# Patient Record
Sex: MALE | Race: WHITE | NOT HISPANIC OR LATINO | Employment: FULL TIME | URBAN - METROPOLITAN AREA
[De-identification: names, ages, dates, MRNs, and addresses within clinical notes are randomized per-mention and may not be internally consistent; named-entity substitution may affect disease eponyms.]

---

## 2020-06-09 ENCOUNTER — OFFICE VISIT (OUTPATIENT)
Dept: FAMILY MEDICINE CLINIC | Facility: CLINIC | Age: 40
End: 2020-06-09
Payer: COMMERCIAL

## 2020-06-09 ENCOUNTER — TELEPHONE (OUTPATIENT)
Dept: FAMILY MEDICINE CLINIC | Facility: CLINIC | Age: 40
End: 2020-06-09

## 2020-06-09 VITALS
SYSTOLIC BLOOD PRESSURE: 130 MMHG | BODY MASS INDEX: 35.56 KG/M2 | DIASTOLIC BLOOD PRESSURE: 80 MMHG | TEMPERATURE: 98.2 F | HEIGHT: 71 IN | RESPIRATION RATE: 18 BRPM | HEART RATE: 69 BPM | WEIGHT: 254 LBS | OXYGEN SATURATION: 98 %

## 2020-06-09 DIAGNOSIS — Z11.4 SCREENING FOR HIV (HUMAN IMMUNODEFICIENCY VIRUS): ICD-10-CM
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
## 2020-06-09 DIAGNOSIS — Z00.00 WELL ADULT EXAM: Primary | ICD-10-CM

## 2020-06-09 DIAGNOSIS — E66.01 SEVERE OBESITY (BMI 35.0-39.9) WITH COMORBIDITY (HCC): ICD-10-CM

## 2020-06-09 DIAGNOSIS — Z23 NEED FOR VACCINATION: ICD-10-CM

## 2020-06-09 DIAGNOSIS — Z13.6 SCREENING FOR CARDIOVASCULAR CONDITION: ICD-10-CM

## 2020-06-09 PROCEDURE — 90471 IMMUNIZATION ADMIN: CPT

## 2020-06-09 PROCEDURE — 3008F BODY MASS INDEX DOCD: CPT | Performed by: FAMILY MEDICINE

## 2020-06-09 PROCEDURE — 99385 PREV VISIT NEW AGE 18-39: CPT | Performed by: FAMILY MEDICINE

## 2020-06-09 PROCEDURE — 90715 TDAP VACCINE 7 YRS/> IM: CPT

## 2020-06-12 DIAGNOSIS — E78.2 MIXED HYPERLIPIDEMIA: Primary | ICD-10-CM

## 2020-06-12 LAB
ALBUMIN SERPL-MCNC: 4.5 G/DL (ref 3.6–5.1)
ALBUMIN/GLOB SERPL: 1.6 (CALC) (ref 1–2.5)
ALP SERPL-CCNC: 71 U/L (ref 36–130)
ALT SERPL-CCNC: 31 U/L (ref 9–46)
AST SERPL-CCNC: 18 U/L (ref 10–40)
B BURGDOR AB SER IA-ACNC: <0.9 INDEX
BILIRUB SERPL-MCNC: 0.4 MG/DL (ref 0.2–1.2)
BUN SERPL-MCNC: 17 MG/DL (ref 7–25)
BUN/CREAT SERPL: NORMAL (CALC) (ref 6–22)
CALCIUM SERPL-MCNC: 9.6 MG/DL (ref 8.6–10.3)
CHLORIDE SERPL-SCNC: 103 MMOL/L (ref 98–110)
CHOLEST SERPL-MCNC: 223 MG/DL
CHOLEST/HDLC SERPL: 3.7 (CALC)
CO2 SERPL-SCNC: 27 MMOL/L (ref 20–32)
CREAT SERPL-MCNC: 0.91 MG/DL (ref 0.6–1.35)
GLOBULIN SER CALC-MCNC: 2.9 G/DL (CALC) (ref 1.9–3.7)
GLUCOSE SERPL-MCNC: 83 MG/DL (ref 65–99)
HDLC SERPL-MCNC: 61 MG/DL
HIV 1+2 AB+HIV1 P24 AG SERPL QL IA: NORMAL
LDLC SERPL CALC-MCNC: 141 MG/DL (CALC)
NONHDLC SERPL-MCNC: 162 MG/DL (CALC)
POTASSIUM SERPL-SCNC: 4.2 MMOL/L (ref 3.5–5.3)
PROT SERPL-MCNC: 7.4 G/DL (ref 6.1–8.1)
SL AMB EGFR AFRICAN AMERICAN: 124 ML/MIN/1.73M2
SL AMB EGFR NON AFRICAN AMERICAN: 107 ML/MIN/1.73M2
SODIUM SERPL-SCNC: 140 MMOL/L (ref 135–146)
TRIGL SERPL-MCNC: 100 MG/DL

## 2020-11-21 ENCOUNTER — TELEPHONE (OUTPATIENT)
Dept: FAMILY MEDICINE CLINIC | Facility: CLINIC | Age: 40
End: 2020-11-21

## 2020-11-21 DIAGNOSIS — Z20.822 EXPOSURE TO COVID-19 VIRUS: Primary | ICD-10-CM

## 2020-11-23 DIAGNOSIS — Z20.822 EXPOSURE TO COVID-19 VIRUS: ICD-10-CM

## 2020-11-23 PROCEDURE — U0003 INFECTIOUS AGENT DETECTION BY NUCLEIC ACID (DNA OR RNA); SEVERE ACUTE RESPIRATORY SYNDROME CORONAVIRUS 2 (SARS-COV-2) (CORONAVIRUS DISEASE [COVID-19]), AMPLIFIED PROBE TECHNIQUE, MAKING USE OF HIGH THROUGHPUT TECHNOLOGIES AS DESCRIBED BY CMS-2020-01-R: HCPCS | Performed by: NURSE PRACTITIONER

## 2020-11-24 LAB — SARS-COV-2 RNA SPEC QL NAA+PROBE: NOT DETECTED

## 2021-01-12 ENCOUNTER — OFFICE VISIT (OUTPATIENT)
Dept: FAMILY MEDICINE CLINIC | Facility: CLINIC | Age: 41
End: 2021-01-12
Payer: COMMERCIAL

## 2021-01-12 VITALS
HEIGHT: 71 IN | DIASTOLIC BLOOD PRESSURE: 80 MMHG | OXYGEN SATURATION: 97 % | BODY MASS INDEX: 36.4 KG/M2 | WEIGHT: 260 LBS | HEART RATE: 82 BPM | RESPIRATION RATE: 16 BRPM | SYSTOLIC BLOOD PRESSURE: 100 MMHG | TEMPERATURE: 97.5 F

## 2021-01-12 DIAGNOSIS — J35.8 CRYPTIC TONSIL: Primary | ICD-10-CM

## 2021-01-12 PROCEDURE — 99213 OFFICE O/P EST LOW 20 MIN: CPT | Performed by: FAMILY MEDICINE

## 2021-01-12 PROCEDURE — 3008F BODY MASS INDEX DOCD: CPT | Performed by: FAMILY MEDICINE

## 2021-01-12 PROCEDURE — 3725F SCREEN DEPRESSION PERFORMED: CPT | Performed by: FAMILY MEDICINE

## 2021-01-12 PROCEDURE — 1036F TOBACCO NON-USER: CPT | Performed by: FAMILY MEDICINE

## 2021-01-12 NOTE — PROGRESS NOTES
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      The following portions of the patient's history were reviewed and updated as appropriate: allergies, current medications, past family history, past medical history, past social history, past surgical history and problem list     Review of Systems   Constitutional: Negative for activity change, appetite change, chills, diaphoresis, fatigue, fever and unexpected weight change  HENT: Negative for congestion, dental problem, ear pain, mouth sores, sinus pressure, sinus pain, sore throat and trouble swallowing  Eyes: Negative for photophobia, discharge and itching  Respiratory: Negative for apnea, chest tightness and shortness of breath  Cardiovascular: Negative for chest pain, palpitations and leg swelling  Gastrointestinal: Negative for abdominal distention, abdominal pain, blood in stool, nausea and vomiting  Endocrine: Negative for cold intolerance, heat intolerance, polydipsia, polyphagia and polyuria  Genitourinary: Negative for difficulty urinating  Musculoskeletal: Negative for arthralgias  Skin: Negative for color change and wound  Neurological: Negative for dizziness, syncope, speech difficulty and headaches  Hematological: Negative for adenopathy  Psychiatric/Behavioral: Negative for agitation and behavioral problems  No current outpatient medications on file  No current facility-administered medications for this visit          Objective:    /80   Pulse 82   Temp 97 5 °F (36 4 °C)   Resp 16    5' 11" (1 803 m)   Wt 118 kg (260 lb)   SpO2 97%   BMI 36 26 kg/m²        Physical Exam  Vitals signs and nursing note reviewed  Constitutional:       General: He is not in acute distress  Appearance: He is well-developed  He is not diaphoretic  HENT:      Head: Normocephalic and atraumatic  Right Ear: External ear normal       Left Ear: External ear normal       Nose: Nose normal       Mouth/Throat:      Pharynx: No oropharyngeal exudate  Comments: Cryptic tonsils B/L  No erythema  No nodes in neck  No masses  Eyes:      General: No scleral icterus  Right eye: No discharge  Left eye: No discharge  Pupils: Pupils are equal, round, and reactive to light  Neck:      Thyroid: No thyromegaly  Cardiovascular:      Rate and Rhythm: Normal rate  Heart sounds: Normal heart sounds  No murmur  Pulmonary:      Effort: Pulmonary effort is normal  No respiratory distress  Breath sounds: Normal breath sounds  No wheezing  Abdominal:      General: Bowel sounds are normal  There is no distension  Palpations: Abdomen is soft  There is no mass  Tenderness: There is no abdominal tenderness  There is no guarding or rebound  Musculoskeletal: Normal range of motion  Skin:     General: Skin is warm and dry  Findings: No erythema or rash  Neurological:      Mental Status: He is alert        Coordination: Coordination normal       Deep Tendon Reflexes: Reflexes normal    Psychiatric:         Behavior: Behavior normal                 Jeanmarie Foil, DO

## 2021-05-14 ENCOUNTER — OFFICE VISIT (OUTPATIENT)
Dept: FAMILY MEDICINE CLINIC | Facility: CLINIC | Age: 41
End: 2021-05-14
Payer: COMMERCIAL

## 2021-05-14 ENCOUNTER — APPOINTMENT (OUTPATIENT)
Dept: RADIOLOGY | Facility: CLINIC | Age: 41
End: 2021-05-14
Payer: COMMERCIAL

## 2021-05-14 VITALS
BODY MASS INDEX: 35 KG/M2 | TEMPERATURE: 97.8 F | HEIGHT: 71 IN | HEART RATE: 71 BPM | SYSTOLIC BLOOD PRESSURE: 108 MMHG | OXYGEN SATURATION: 97 % | DIASTOLIC BLOOD PRESSURE: 60 MMHG | WEIGHT: 250 LBS | RESPIRATION RATE: 16 BRPM

## 2021-05-14 DIAGNOSIS — Z00.00 WELL ADULT EXAM: Primary | ICD-10-CM

## 2021-05-14 DIAGNOSIS — M25.512 ACUTE PAIN OF LEFT SHOULDER: ICD-10-CM

## 2021-05-14 DIAGNOSIS — L25.5 DERMATITIS DUE TO PLANTS: ICD-10-CM

## 2021-05-14 DIAGNOSIS — Z13.6 SCREENING FOR CARDIOVASCULAR CONDITION: ICD-10-CM

## 2021-05-14 PROCEDURE — 73030 X-RAY EXAM OF SHOULDER: CPT

## 2021-05-14 PROCEDURE — 99395 PREV VISIT EST AGE 18-39: CPT | Performed by: FAMILY MEDICINE

## 2021-05-14 RX ORDER — MOMETASONE FUROATE 1 MG/G
CREAM TOPICAL DAILY
Qty: 45 G | Refills: 0 | Status: SHIPPED | OUTPATIENT
Start: 2021-05-14 | End: 2021-06-22

## 2021-05-14 NOTE — PROGRESS NOTES
FAMILY PRACTICE HEALTH MAINTENANCE OFFICE VISIT  [facility]    NAME: Quin Green  AGE: 47 y o  SEX: male  : 1980     DATE: 2021    Assessment and Plan     1  Well adult exam    2  Screening for cardiovascular condition  -     Comprehensive metabolic panel; Future  -     Lipid Panel with Direct LDL reflex; Future    3  Dermatitis due to plants  -     mometasone (ELOCON) 0 1 % cream; Apply topically daily    4  Acute pain of left shoulder  -     XR shoulder 2+ vw left; Future; Expected date: 2021        · Patient Counseling:   · Nutrition: Stressed importance of a well balanced diet, moderation of sodium/saturated fat, caloric balance and sufficient intake of fiber  · Exercise: Stressed the importance of regular exercise with a goal of 150 minutes per week  · Dental Health: Discussed daily flossing and brushing and regular dental visits     · Immunizations reviewed: Risks and Benefits discussed  · Discussed benefits of:  Screening labs   BMI Counseling: Body mass index is 35 11 kg/m²  Discussed with patient's BMI with him  The BMI is above normal  Nutrition recommendations include reducing portion sizes  Return in about 1 year (around 2022) for Recheck          Chief Complaint     Chief Complaint   Patient presents with    Physical Exam     mz cma       History of Present Illness     Pt is here for a full physical      Well Adult Physical   Patient here for a comprehensive physical exam       Diet and Physical Activity  Diet: well balanced diet  Exercise: frequently      Depression Screen  PHQ-9 Depression Screening    PHQ-9:   Frequency of the following problems over the past two weeks:              General Health  Hearing: Normal:  bilateral  Vision: no vision problems  Dental: regular dental visits    Reproductive Health  No issues       The following portions of the patient's history were reviewed and updated as appropriate: allergies, current medications, past family history, past medical history, past social history, past surgical history and problem list     Review of Systems     Review of Systems   Constitutional: Negative for activity change, appetite change, chills, diaphoresis, fatigue, fever and unexpected weight change  HENT: Negative for congestion, dental problem, ear pain, mouth sores, sinus pressure, sinus pain, sore throat and trouble swallowing  Eyes: Negative for photophobia, discharge and itching  Respiratory: Negative for apnea, chest tightness and shortness of breath  Cardiovascular: Negative for chest pain, palpitations and leg swelling  Gastrointestinal: Negative for abdominal distention, abdominal pain, blood in stool, nausea and vomiting  Endocrine: Negative for cold intolerance, heat intolerance, polydipsia, polyphagia and polyuria  Genitourinary: Negative for difficulty urinating  Musculoskeletal: Negative for arthralgias  Skin: Negative for color change and wound  Neurological: Negative for dizziness, syncope, speech difficulty and headaches  Hematological: Negative for adenopathy  Psychiatric/Behavioral: Negative for agitation and behavioral problems  Past Medical History     History reviewed  No pertinent past medical history  Past Surgical History     History reviewed  No pertinent surgical history      Social History     Social History     Socioeconomic History    Marital status: /Civil Union     Spouse name: None    Number of children: None    Years of education: None    Highest education level: None   Occupational History    None   Social Needs    Financial resource strain: None    Food insecurity     Worry: None     Inability: None    Transportation needs     Medical: None     Non-medical: None   Tobacco Use    Smoking status: Never Smoker    Smokeless tobacco: Never Used   Substance and Sexual Activity    Alcohol use: Yes     Frequency: 2-4 times a month    Drug use: Never    Sexual activity: None   Lifestyle    Physical activity     Days per week: None     Minutes per session: None    Stress: None   Relationships    Social connections     Talks on phone: None     Gets together: None     Attends Temple service: None     Active member of club or organization: None     Attends meetings of clubs or organizations: None     Relationship status: None    Intimate partner violence     Fear of current or ex partner: None     Emotionally abused: None     Physically abused: None     Forced sexual activity: None   Other Topics Concern    None   Social History Narrative    None       Family History     Family History   Problem Relation Age of Onset    No Known Problems Mother     Hyperlipidemia Father     Prostate cancer Maternal Grandfather     Colon cancer Maternal Grandfather     Alzheimer's disease Paternal Grandmother     Alcohol abuse Paternal Grandfather          in early 63's    Mental illness Neg Hx        Current Medications       Current Outpatient Medications:     mometasone (ELOCON) 0 1 % cream, Apply topically daily, Disp: 45 g, Rfl: 0     Allergies     No Known Allergies    Objective     /60   Pulse 71   Temp 97 8 °F (36 6 °C)   Resp 16   Ht 5' 10 75" (1 797 m)   Wt 113 kg (250 lb)   SpO2 97%   BMI 35 11 kg/m²      Physical Exam  Vitals signs and nursing note reviewed  Constitutional:       General: He is not in acute distress  Appearance: He is well-developed  He is not diaphoretic  HENT:      Head: Normocephalic and atraumatic  Right Ear: External ear normal       Left Ear: External ear normal       Nose: Nose normal       Mouth/Throat:      Pharynx: No oropharyngeal exudate  Eyes:      General: No scleral icterus  Right eye: No discharge  Left eye: No discharge  Pupils: Pupils are equal, round, and reactive to light  Neck:      Thyroid: No thyromegaly     Cardiovascular:      Rate and Rhythm: Normal rate       Heart sounds: Normal heart sounds  No murmur  Pulmonary:      Effort: Pulmonary effort is normal  No respiratory distress  Breath sounds: Normal breath sounds  No wheezing  Abdominal:      General: Bowel sounds are normal  There is no distension  Palpations: Abdomen is soft  There is no mass  Tenderness: There is no abdominal tenderness  There is no guarding or rebound  Musculoskeletal: Normal range of motion  Skin:     General: Skin is warm and dry  Findings: Rash (rt forearm) present  No erythema  Neurological:      Mental Status: He is alert        Coordination: Coordination normal       Deep Tendon Reflexes: Reflexes normal    Psychiatric:         Behavior: Behavior normal             Visual Acuity Screening    Right eye Left eye Both eyes   Without correction: 20/15 20/20 15   With correction:              Katalina Salinas, [address]

## 2021-05-20 ENCOUNTER — TELEPHONE (OUTPATIENT)
Dept: FAMILY MEDICINE CLINIC | Facility: CLINIC | Age: 41
End: 2021-05-20

## 2021-05-20 LAB
ALBUMIN SERPL-MCNC: 4.2 G/DL (ref 3.6–5.1)
ALBUMIN/GLOB SERPL: 1.6 (CALC) (ref 1–2.5)
ALP SERPL-CCNC: 60 U/L (ref 36–130)
ALT SERPL-CCNC: 22 U/L (ref 9–46)
AST SERPL-CCNC: 15 U/L (ref 10–40)
BILIRUB SERPL-MCNC: 0.5 MG/DL (ref 0.2–1.2)
BUN SERPL-MCNC: 18 MG/DL (ref 7–25)
BUN/CREAT SERPL: NORMAL (CALC) (ref 6–22)
CALCIUM SERPL-MCNC: 9 MG/DL (ref 8.6–10.3)
CHLORIDE SERPL-SCNC: 106 MMOL/L (ref 98–110)
CHOLEST SERPL-MCNC: 224 MG/DL
CHOLEST/HDLC SERPL: 4.3 (CALC)
CO2 SERPL-SCNC: 27 MMOL/L (ref 20–32)
CREAT SERPL-MCNC: 0.89 MG/DL (ref 0.6–1.35)
GLOBULIN SER CALC-MCNC: 2.7 G/DL (CALC) (ref 1.9–3.7)
GLUCOSE SERPL-MCNC: 92 MG/DL (ref 65–99)
HDLC SERPL-MCNC: 52 MG/DL
LDLC SERPL CALC-MCNC: 151 MG/DL (CALC)
NONHDLC SERPL-MCNC: 172 MG/DL (CALC)
POTASSIUM SERPL-SCNC: 4.3 MMOL/L (ref 3.5–5.3)
PROT SERPL-MCNC: 6.9 G/DL (ref 6.1–8.1)
SL AMB EGFR AFRICAN AMERICAN: 126 ML/MIN/1.73M2
SL AMB EGFR NON AFRICAN AMERICAN: 108 ML/MIN/1.73M2
SODIUM SERPL-SCNC: 140 MMOL/L (ref 135–146)
TRIGL SERPL-MCNC: 100 MG/DL

## 2021-05-20 NOTE — TELEPHONE ENCOUNTER
Pt is inquiring about his xray results  Pt also has his bw done and the results should be coming in  He has [place] well paper work that can be filled out   Pls advise

## 2021-05-20 NOTE — TELEPHONE ENCOUNTER
Xray was seen prior to seeing this message     was sent, ok to call pt and let him know his X ray of the shoulder was acceptable

## 2021-05-20 NOTE — TELEPHONE ENCOUNTER
Spoke with Judy Gómez from the reading room, they will have his x-ray results read from 5/14/21  I do not see that we received his lab work results back yet   Familia Roberts, [address]

## 2021-05-22 ENCOUNTER — OFFICE VISIT (OUTPATIENT)
Dept: URGENT CARE | Facility: CLINIC | Age: 41
End: 2021-05-22
Payer: COMMERCIAL

## 2021-05-22 VITALS
RESPIRATION RATE: 18 BRPM | TEMPERATURE: 98.4 F | HEART RATE: 69 BPM | DIASTOLIC BLOOD PRESSURE: 109 MMHG | BODY MASS INDEX: 35 KG/M2 | HEIGHT: 71 IN | WEIGHT: 250 LBS | OXYGEN SATURATION: 98 % | SYSTOLIC BLOOD PRESSURE: 164 MMHG

## 2021-05-22 DIAGNOSIS — L50.9 HIVES: Primary | ICD-10-CM

## 2021-05-22 PROCEDURE — 99213 OFFICE O/P EST LOW 20 MIN: CPT | Performed by: PHYSICIAN ASSISTANT

## 2021-05-22 RX ORDER — METHYLPREDNISOLONE SODIUM SUCCINATE 40 MG/ML
60 INJECTION, POWDER, LYOPHILIZED, FOR SOLUTION INTRAMUSCULAR; INTRAVENOUS ONCE
Status: COMPLETED | OUTPATIENT
Start: 2021-05-22 | End: 2021-05-22

## 2021-05-22 RX ORDER — METHYLPREDNISOLONE SODIUM SUCCINATE 125 MG/2ML
125 INJECTION, POWDER, LYOPHILIZED, FOR SOLUTION INTRAMUSCULAR; INTRAVENOUS ONCE
Status: DISCONTINUED | OUTPATIENT
Start: 2021-05-22 | End: 2021-05-22

## 2021-05-22 RX ADMIN — METHYLPREDNISOLONE SODIUM SUCCINATE 60 MG: 40 INJECTION, POWDER, LYOPHILIZED, FOR SOLUTION INTRAMUSCULAR; INTRAVENOUS at 14:34

## 2021-05-22 NOTE — PATIENT INSTRUCTIONS
Rash appears to be a hive like allergic reaction to some environmental cause  Given a shot of steroid in the clinic today to help stop inflammatory process  Also, prescribed topical steroid for at home  Can take a benadryl at bedtime and take a claritin or zyrtec daily in the morning  Be cautious of environmental factors to try and pinpoint a trigger  Be sure to use gentle soaps and laundry detergents with our fragrance  Good gentle brands are tide free and clear for laundry  Cetaphil, CeraVe, Vanicream or aveeno of body washes, lotions and soaps  If symptoms persist follow up with your primary care doctor or with dermatology or an allergist for further investigation into triggers  Urticaria   WHAT YOU NEED TO KNOW:   Urticaria is also called hives  Hives can change size and shape, and appear anywhere on your skin  They can be mild or severe and last from a few minutes to a few days  Hives may be a sign of a severe allergic reaction called anaphylaxis that needs immediate treatment  Urticaria that lasts longer than 6 weeks may be a chronic condition that needs long-term treatment  DISCHARGE INSTRUCTIONS:   Call 911 for signs or symptoms of anaphylaxis,  such as trouble breathing, swelling in your mouth or throat, or wheezing  You may also have itching, a rash, or feel like you are going to faint  Return to the emergency department if:   · Your heart is beating faster than it normally does  · You have cramping or severe pain in your abdomen  Contact your healthcare provider if:   · You have a fever  · Your skin still itches 24 hours after you take your medicine  · You still have hives after 7 days  · Your joints are painful and swollen  · You have questions or concerns about your condition or care  Medicines:   · Epinephrine  is used to treat severe allergic reactions such as anaphylaxis  · Antihistamines  decrease mild symptoms such as itching or a rash      · Steroids  decrease redness, pain, and swelling  · Take your medicine as directed  Contact your healthcare provider if you think your medicine is not helping or if you have side effects  Tell him of her if you are allergic to any medicine  Keep a list of the medicines, vitamins, and herbs you take  Include the amounts, and when and why you take them  Bring the list or the pill bottles to follow-up visits  Carry your medicine list with you in case of an emergency  Steps to take for signs or symptoms of anaphylaxis:   · Immediately  give 1 shot of epinephrine only into the outer thigh muscle  · Leave the shot in place  as directed  Your healthcare provider may recommend you leave it in place for up to 10 seconds before you remove it  This helps make sure all of the epinephrine is delivered  · Call 911 and go to the emergency department,  even if the shot improved symptoms  Do not drive yourself  Bring the used epinephrine shot with you  Safety precautions to take if you are at risk for anaphylaxis:   · Keep 2 shots of epinephrine with you at all times  You may need a second shot, because epinephrine only works for about 20 minutes and symptoms may return  Your healthcare provider can show you and family members how to give the shot  Check the expiration date every month and replace it before it expires  · Create an action plan  Your healthcare provider can help you create a written plan that explains the allergy and an emergency plan to treat a reaction  The plan explains when to give a second epinephrine shot if symptoms return or do not improve after the first  Give copies of the action plan and emergency instructions to family members, work and school staff, and  providers  Show them how to give a shot of epinephrine  · Be careful when you exercise  If you have had exercise-induced anaphylaxis, do not exercise right after you eat   Stop exercising right away if you start to develop any signs or symptoms of anaphylaxis  You may first feel tired, warm, or have itchy skin  Hives, swelling, and severe breathing problems may develop if you continue to exercise  · Carry medical alert identification  Wear medical alert jewelry or carry a card that explains the allergy  Ask your healthcare provider where to get these items  · Keep a record of triggers and symptoms  Record everything you eat, drink, or apply to your skin for 3 weeks  Include stressful events and what you were doing right before your hives started  Bring the record with you to follow-up visits with your healthcare provider  Manage urticaria:   · Cool your skin  This may help decrease itching  Apply a cool pack to your hives  Dip a hand towel in cool water, wring it out, and place it on your hives  You may also soak your skin in a cool oatmeal bath  · Do not rub your hives  This can irritate your skin and cause more hives  · Wear loose clothing  Tight clothes may irritate your skin and cause more hives  · Manage stress  Stress may trigger hives, or make them worse  Learn new ways to relax, such as deep breathing  Follow up with your healthcare provider as directed:  Write down your questions so you remember to ask them during your visits  © Copyright [address] Information is for End User's use only and may not be sold, redistributed or otherwise used for commercial purposes  All illustrations and images included in CareNotes® are the copyrighted property of A D A M [employer] Inc  or Virginia Diaz  The above information is an  only  It is not intended as medical advice for individual conditions or treatments  Talk to your doctor, nurse or pharmacist before following any medical regimen to see if it is safe and effective for you

## 2021-05-24 ENCOUNTER — OFFICE VISIT (OUTPATIENT)
Dept: URGENT CARE | Facility: CLINIC | Age: 41
End: 2021-05-24
Payer: COMMERCIAL

## 2021-05-24 VITALS
HEIGHT: 71 IN | BODY MASS INDEX: 35.28 KG/M2 | OXYGEN SATURATION: 99 % | TEMPERATURE: 97.5 F | HEART RATE: 51 BPM | SYSTOLIC BLOOD PRESSURE: 121 MMHG | DIASTOLIC BLOOD PRESSURE: 90 MMHG | WEIGHT: 252 LBS

## 2021-05-24 DIAGNOSIS — L50.9 HIVES: Primary | ICD-10-CM

## 2021-05-24 PROCEDURE — 99213 OFFICE O/P EST LOW 20 MIN: CPT | Performed by: PHYSICIAN ASSISTANT

## 2021-05-24 PROCEDURE — 1036F TOBACCO NON-USER: CPT | Performed by: PHYSICIAN ASSISTANT

## 2021-05-24 PROCEDURE — 3008F BODY MASS INDEX DOCD: CPT | Performed by: PHYSICIAN ASSISTANT

## 2021-05-24 RX ORDER — PREDNISONE 50 MG/1
50 TABLET ORAL DAILY
Qty: 5 TABLET | Refills: 0 | Status: SHIPPED | OUTPATIENT
Start: 2021-05-24 | End: 2021-05-29

## 2021-05-24 NOTE — PATIENT INSTRUCTIONS
Allergic reaction to unknown environmental trigger  Improved but not gone completely  rx prednisone once daily x 5 days sent via EMR  Can continue benadryl and steroid cream as needed  Cool compress as needed    Follow up with PCP in 3-5 days  Proceed to  ER if symptoms worsen  Urticaria   WHAT YOU NEED TO KNOW:   Urticaria is also called hives  Hives can change size and shape, and appear anywhere on your skin  They can be mild or severe and last from a few minutes to a few days  Hives may be a sign of a severe allergic reaction called anaphylaxis that needs immediate treatment  Urticaria that lasts longer than 6 weeks may be a chronic condition that needs long-term treatment  DISCHARGE INSTRUCTIONS:   Call 911 for signs or symptoms of anaphylaxis,  such as trouble breathing, swelling in your mouth or throat, or wheezing  You may also have itching, a rash, or feel like you are going to faint  Return to the emergency department if:   · Your heart is beating faster than it normally does  · You have cramping or severe pain in your abdomen  Contact your healthcare provider if:   · You have a fever  · Your skin still itches 24 hours after you take your medicine  · You still have hives after 7 days  · Your joints are painful and swollen  · You have questions or concerns about your condition or care  Medicines:   · Epinephrine  is used to treat severe allergic reactions such as anaphylaxis  · Antihistamines  decrease mild symptoms such as itching or a rash  · Steroids  decrease redness, pain, and swelling  · Take your medicine as directed  Contact your healthcare provider if you think your medicine is not helping or if you have side effects  Tell him of her if you are allergic to any medicine  Keep a list of the medicines, vitamins, and herbs you take  Include the amounts, and when and why you take them  Bring the list or the pill bottles to follow-up visits   Carry your medicine list with you in case of an emergency  Steps to take for signs or symptoms of anaphylaxis:   · Immediately  give 1 shot of epinephrine only into the outer thigh muscle  · Leave the shot in place  as directed  Your healthcare provider may recommend you leave it in place for up to 10 seconds before you remove it  This helps make sure all of the epinephrine is delivered  · Call 911 and go to the emergency department,  even if the shot improved symptoms  Do not drive yourself  Bring the used epinephrine shot with you  Safety precautions to take if you are at risk for anaphylaxis:   · Keep 2 shots of epinephrine with you at all times  You may need a second shot, because epinephrine only works for about 20 minutes and symptoms may return  Your healthcare provider can show you and family members how to give the shot  Check the expiration date every month and replace it before it expires  · Create an action plan  Your healthcare provider can help you create a written plan that explains the allergy and an emergency plan to treat a reaction  The plan explains when to give a second epinephrine shot if symptoms return or do not improve after the first  Give copies of the action plan and emergency instructions to family members, work and school staff, and  providers  Show them how to give a shot of epinephrine  · Be careful when you exercise  If you have had exercise-induced anaphylaxis, do not exercise right after you eat  Stop exercising right away if you start to develop any signs or symptoms of anaphylaxis  You may first feel tired, warm, or have itchy skin  Hives, swelling, and severe breathing problems may develop if you continue to exercise  · Carry medical alert identification  Wear medical alert jewelry or carry a card that explains the allergy  Ask your healthcare provider where to get these items  · Keep a record of triggers and symptoms    Record everything you eat, drink, or apply to your skin for 3 weeks  Include stressful events and what you were doing right before your hives started  Bring the record with you to follow-up visits with your healthcare provider  Manage urticaria:   · Cool your skin  This may help decrease itching  Apply a cool pack to your hives  Dip a hand towel in cool water, wring it out, and place it on your hives  You may also soak your skin in a cool oatmeal bath  · Do not rub your hives  This can irritate your skin and cause more hives  · Wear loose clothing  Tight clothes may irritate your skin and cause more hives  · Manage stress  Stress may trigger hives, or make them worse  Learn new ways to relax, such as deep breathing  Follow up with your healthcare provider as directed:  Write down your questions so you remember to ask them during your visits  © Copyright [address] Information is for End User's use only and may not be sold, redistributed or otherwise used for commercial purposes  All illustrations and images included in CareNotes® are the copyrighted property of [employer]  or [facility] Ayanna Anand   The above information is an  only  It is not intended as medical advice for individual conditions or treatments  Talk to your doctor, nurse or pharmacist before following any medical regimen to see if it is safe and effective for you

## 2021-05-24 NOTE — PROGRESS NOTES
[facility] Now        NAME: Robbie Ruiz is a 47 y o  male  : 1980    MRN: [id number]  DATE: May 24, 2021  TIME: 10:01 AM    Assessment and Plan   Hives [L50 9]  1  Hives  predniSONE 50 mg tablet       Patient Instructions   Allergic reaction to unknown environmental trigger  Improved but not gone completely  rx prednisone once daily x 5 days sent via EMR  Can continue benadryl and steroid cream as needed  Cool compress as needed    Follow up with PCP in 3-5 days  Proceed to  ER if symptoms worsen  Chief Complaint     Chief Complaint   Patient presents with    Urticaria     scattered uirticaria was seen Saturday and given Solumedrol, taking Benadryl , Kenalog ointment  responded failly well on  and then it returened to other parts of body, legs,back, scalp          History of Present Illness       Nikolay is a 27-year-old male who presents to clinic complaining of rash due to an allergic reaction  He was in the clinic 2 days ago and given a steroid injection he states that the rash improved mildly however he also noticed a rash in which was new on his legs and buttocks  He states is new rash is similar to the old rash but not as severe  He states the rash is red and itchy  He denies any pain or discharge  He still is trying to figure out the red because of the original rash she thinks that had to do with his uniform which was [employer] to 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 which afterwards patient noticed throat irritation and cough  He thinks there might have been something at the house that he was exposed to an allergic  Review of Systems   Review of Systems   Constitutional: Negative  Skin: Positive for color change and rash       Current Medications       Current Outpatient Medications:     mometasone (ELOCON) 0 1 % cream, Apply topically daily, Disp: 45 g, Rfl: 0    triamcinolone (KENALOG) 0 1 % ointment, Apply topically 2 (two) times a day, Disp: 30 g, Rfl: 0    predniSONE 50 mg tablet, Take 1 tablet (50 mg total) by mouth daily for 5 days, Disp: 5 tablet, Rfl: 0    Current Allergies     Allergies as of 2021    (No Known Allergies)            The following portions of the patient's history were reviewed and updated as appropriate: allergies, current medications, past family history, past medical history, past social history, past surgical history and problem list      No past medical history on file  No past surgical history on file  Family History   Problem Relation Age of Onset    No Known Problems Mother     Hyperlipidemia Father     Prostate cancer Maternal Grandfather     Colon cancer Maternal Grandfather     Alzheimer's disease Paternal Grandmother     Alcohol abuse Paternal Grandfather          in early     Mental illness Neg Hx          Medications have been verified  Objective   /90   Pulse (!) 51   Temp 97 5 °F (36 4 °C)   Ht 5' 11" (1 803 m)   Wt 114 kg (252 lb)   SpO2 99%   BMI 35 15 kg/m²   No LMP for male patient  Physical Exam     Physical Exam  Vitals signs and nursing note reviewed  Constitutional:       General: He is not in acute distress  Appearance: Normal appearance  He is not ill-appearing  Cardiovascular:      Rate and Rhythm: Normal rate and regular rhythm  Heart sounds: Normal heart sounds  Pulmonary:      Effort: Pulmonary effort is normal       Breath sounds: Normal breath sounds  Skin:     Findings: Rash present  Rash is urticarial           Neurological:      Mental Status: He is alert and oriented to person, place, and time     Psychiatric:         Mood and Affect: Mood normal          Behavior: Behavior normal

## 2021-05-25 ENCOUNTER — TELEPHONE (OUTPATIENT)
Dept: FAMILY MEDICINE CLINIC | Facility: CLINIC | Age: 41
End: 2021-05-25

## 2021-05-25 NOTE — PROGRESS NOTES
[facility]        NAME: Nette Valdez is a 47 y o  male  : 1980    MRN: [id number]  DATE: May 24, 2021  TIME: 9:09 PM    Assessment and Plan   Hives [L50 9]  1  Hives  methylPREDNISolone sodium succinate (Solu-MEDROL) injection 60 mg    triamcinolone (KENALOG) 0 1 % ointment    DISCONTINUED: methylPREDNISolone sodium succinate (Solu-MEDROL) injection 125 mg         Patient Instructions     Patient Instructions     Rash appears to be a hive like allergic reaction to some environmental cause  Given a shot of steroid in the clinic today to help stop inflammatory process  Also, prescribed topical steroid for at home  Can take a benadryl at bedtime and take a claritin or zyrtec daily in the morning  Be cautious of environmental factors to try and pinpoint a trigger  Be sure to use gentle soaps and laundry detergents with our fragrance  Good gentle brands are tide free and clear for laundry  Cetaphil, CeraVe, Vanicream or aveeno of body washes, lotions and soaps  If symptoms persist follow up with your primary care doctor or with dermatology or an allergist for further investigation into triggers  Urticaria   WHAT YOU NEED TO KNOW:   Urticaria is also called hives  Hives can change size and shape, and appear anywhere on your skin  They can be mild or severe and last from a few minutes to a few days  Hives may be a sign of a severe allergic reaction called anaphylaxis that needs immediate treatment  Urticaria that lasts longer than 6 weeks may be a chronic condition that needs long-term treatment  DISCHARGE INSTRUCTIONS:   Call 911 for signs or symptoms of anaphylaxis,  such as trouble breathing, swelling in your mouth or throat, or wheezing  You may also have itching, a rash, or feel like you are going to faint  Return to the emergency department if:   · Your heart is beating faster than it normally does  · You have cramping or severe pain in your abdomen      Contact your healthcare provider if:   · You have a fever  · Your skin still itches 24 hours after you take your medicine  · You still have hives after 7 days  · Your joints are painful and swollen  · You have questions or concerns about your condition or care  Medicines:   · Epinephrine  is used to treat severe allergic reactions such as anaphylaxis  · Antihistamines  decrease mild symptoms such as itching or a rash  · Steroids  decrease redness, pain, and swelling  · Take your medicine as directed  Contact your healthcare provider if you think your medicine is not helping or if you have side effects  Tell him of her if you are allergic to any medicine  Keep a list of the medicines, vitamins, and herbs you take  Include the amounts, and when and why you take them  Bring the list or the pill bottles to follow-up visits  Carry your medicine list with you in case of an emergency  Steps to take for signs or symptoms of anaphylaxis:   · Immediately  give 1 shot of epinephrine only into the outer thigh muscle  · Leave the shot in place  as directed  Your healthcare provider may recommend you leave it in place for up to 10 seconds before you remove it  This helps make sure all of the epinephrine is delivered  · Call 911 and go to the emergency department,  even if the shot improved symptoms  Do not drive yourself  Bring the used epinephrine shot with you  Safety precautions to take if you are at risk for anaphylaxis:   · Keep 2 shots of epinephrine with you at all times  You may need a second shot, because epinephrine only works for about 20 minutes and symptoms may return  Your healthcare provider can show you and family members how to give the shot  Check the expiration date every month and replace it before it expires  · Create an action plan  Your healthcare provider can help you create a written plan that explains the allergy and an emergency plan to treat a reaction   The plan explains when to give a second epinephrine shot if symptoms return or do not improve after the first  Give copies of the action plan and emergency instructions to family members, work and school staff, and  providers  Show them how to give a shot of epinephrine  · Be careful when you exercise  If you have had exercise-induced anaphylaxis, do not exercise right after you eat  Stop exercising right away if you start to develop any signs or symptoms of anaphylaxis  You may first feel tired, warm, or have itchy skin  Hives, swelling, and severe breathing problems may develop if you continue to exercise  · Carry medical alert identification  Wear medical alert jewelry or carry a card that explains the allergy  Ask your healthcare provider where to get these items  · Keep a record of triggers and symptoms  Record everything you eat, drink, or apply to your skin for 3 weeks  Include stressful events and what you were doing right before your hives started  Bring the record with you to follow-up visits with your healthcare provider  Manage urticaria:   · Cool your skin  This may help decrease itching  Apply a cool pack to your hives  Dip a hand towel in cool water, wring it out, and place it on your hives  You may also soak your skin in a cool oatmeal bath  · Do not rub your hives  This can irritate your skin and cause more hives  · Wear loose clothing  Tight clothes may irritate your skin and cause more hives  · Manage stress  Stress may trigger hives, or make them worse  Learn new ways to relax, such as deep breathing  Follow up with your healthcare provider as directed:  Write down your questions so you remember to ask them during your visits  © Copyright [address] Information is for End User's use only and may not be sold, redistributed or otherwise used for commercial purposes   All illustrations and images included in CareNotes® are the copyrighted property of [employer] D [employer] Inc  or [employer]  The above information is an  only  It is not intended as medical advice for individual conditions or treatments  Talk to your doctor, nurse or pharmacist before following any medical regimen to see if it is safe and effective for you  Follow up with PCP in 3-5 days  Proceed to  ER if symptoms worsen  Chief Complaint     Chief Complaint   Patient presents with    Allergic Reaction     Pt reports of worsening rash and itchiness  Pt denies any SOB or difficulty swallowing  History of Present Illness        53-year-old male presents with acute hives on the back of his head, neck and spreading down his trunk that he noticed over the last 5 hours  Patient was at work when his boss noticed the redness  Patient denies feeling extremely itchy while at work however once the rash started to spread to his neck and chest it became more itchy  Patient denies any new environmental factors such as laundry detergent, soaps, lotions, pet exposure, medications,  Outdoor plant or tree exposures  Patient denies any difficulty breathing, throat,  Tongue or lip swelling  Review of Systems   Review of Systems   Constitutional: Negative for fatigue and fever  HENT: Negative for facial swelling  Respiratory: Negative for cough, shortness of breath and wheezing  Cardiovascular: Negative for chest pain and palpitations  Skin: Positive for rash  Neurological: Negative for headaches           Current Medications       Current Outpatient Medications:     mometasone (ELOCON) 0 1 % cream, Apply topically daily, Disp: 45 g, Rfl: 0    predniSONE 50 mg tablet, Take 1 tablet (50 mg total) by mouth daily for 5 days, Disp: 5 tablet, Rfl: 0    triamcinolone (KENALOG) 0 1 % ointment, Apply topically 2 (two) times a day, Disp: 30 g, Rfl: 0    Current Allergies     Allergies as of 05/22/2021    (No Known Allergies)            The following portions of the patient's history were reviewed and updated as appropriate: allergies, current medications, past family history, past medical history, past social history, past surgical history and problem list      History reviewed  No pertinent past medical history  History reviewed  No pertinent surgical history  Family History   Problem Relation Age of Onset    No Known Problems Mother     Hyperlipidemia Father     Prostate cancer Maternal Grandfather     Colon cancer Maternal Grandfather     Alzheimer's disease Paternal Grandmother     Alcohol abuse Paternal Grandfather          in early 's    Mental illness Neg Hx          Medications have been verified  Objective   BP (!) 164/109   Pulse 69   Temp 98 4 °F (36 9 °C)   Resp 18   Ht 5' 10 5" (1 791 m)   Wt 113 kg (250 lb)   SpO2 98%   BMI 35 36 kg/m²        Physical Exam     Physical Exam  Vitals signs and nursing note reviewed  Constitutional:       Appearance: Normal appearance  HENT:      Mouth/Throat:      Mouth: Mucous membranes are moist       Pharynx: Oropharynx is clear  No posterior oropharyngeal erythema  Comments: Airway patent no signs of angioedema  Eyes:      Extraocular Movements: Extraocular movements intact  Pupils: Pupils are equal, round, and reactive to light  Cardiovascular:      Rate and Rhythm: Normal rate and regular rhythm  Pulses: Normal pulses  Heart sounds: Normal heart sounds  Pulmonary:      Effort: Pulmonary effort is normal       Breath sounds: Normal breath sounds  Skin:     Findings: Rash present  Rash is urticarial       Comments:   Erythematous, urticarial wheals noticed on the posterior neck occipital scalp   Neurological:      Mental Status: He is alert and oriented to person, place, and time

## 2021-06-22 ENCOUNTER — OFFICE VISIT (OUTPATIENT)
Dept: FAMILY MEDICINE CLINIC | Facility: CLINIC | Age: 41
End: 2021-06-22
Payer: COMMERCIAL

## 2021-06-22 VITALS
TEMPERATURE: 97.5 F | RESPIRATION RATE: 16 BRPM | OXYGEN SATURATION: 97 % | WEIGHT: 250 LBS | SYSTOLIC BLOOD PRESSURE: 110 MMHG | DIASTOLIC BLOOD PRESSURE: 80 MMHG | BODY MASS INDEX: 35 KG/M2 | HEIGHT: 71 IN | HEART RATE: 65 BPM

## 2021-06-22 DIAGNOSIS — L30.9 DERMATITIS: Primary | ICD-10-CM

## 2021-06-22 PROCEDURE — 99213 OFFICE O/P EST LOW 20 MIN: CPT | Performed by: FAMILY MEDICINE

## 2021-06-22 RX ORDER — MOMETASONE FUROATE 1 MG/G
CREAM TOPICAL DAILY
Qty: 45 G | Refills: 0 | Status: SHIPPED | OUTPATIENT
Start: 2021-06-22 | End: 2021-08-19

## 2021-06-22 NOTE — PROGRESS NOTES
Assessment/Plan:    1  Dermatitis  -     mometasone (ELOCON) 0 1 % cream; Apply topically daily  -     Ambulatory referral to Dermatology; Future  -     [facility] Panel, Adult; Future  -     Allergen Profile, Basic Food; Future    Pt is expecting a son in three weeks suppose could be neurodermatitia  Will have him evaluated by derm  This is the first time I am seeing him for this        There are no Patient Instructions on file for this visit  No follow-ups on file  Subjective:      Patient ID: Vicky aMnzo is a 47 y o  male  Chief Complaint   Patient presents with    Recurring condition     [id number]    Rash       Pt states he has a skin condition  Pt states he was at work a few weks ago - his neck started to swell up, pt went to urgent care - was given tsteroid shot , it did not clear up  Strated moving all over his body  States they gave him oral steroids and a cream  As well as benadryl  Pt states he was good for a week and a half  States his wife got into an accident and she is pregnanat - it came back and cleared  Went on vacation and it cam,e back  States now he has irriation and his arms  No pain  Just itchy some days its fine some days its bad            The following portions of the patient's history were reviewed and updated as appropriate: allergies, current medications, past family history, past medical history, past social history, past surgical history and problem list     Review of Systems   Constitutional: Negative for activity change, appetite change, chills, diaphoresis, fatigue, fever and unexpected weight change  HENT: Negative for congestion, dental problem, ear pain, mouth sores, sinus pressure, sinus pain, sore throat and trouble swallowing  Eyes: Negative for photophobia, discharge and itching  Respiratory: Negative for apnea, chest tightness and shortness of breath  Cardiovascular: Negative for chest pain, palpitations and leg swelling  Gastrointestinal: Negative for abdominal distention, abdominal pain, blood in stool, nausea and vomiting  Endocrine: Negative for cold intolerance, heat intolerance, polydipsia, polyphagia and polyuria  Genitourinary: Negative for difficulty urinating  Musculoskeletal: Negative for arthralgias  Skin: Positive for rash  Negative for color change and wound  Neurological: Negative for dizziness, syncope, speech difficulty and headaches  Hematological: Negative for adenopathy  Psychiatric/Behavioral: Negative for agitation and behavioral problems  Current Outpatient Medications   Medication Sig Dispense Refill    triamcinolone (KENALOG) 0 1 % ointment Apply topically 2 (two) times a day 30 g 0    mometasone (ELOCON) 0 1 % cream Apply topically daily 45 g 0     No current facility-administered medications for this visit  Objective:    /80   Pulse 65   Temp 97 5 °F (36 4 °C)   Resp 16   Ht 5' 11" (1 803 m)   Wt 113 kg (250 lb)   SpO2 97%   BMI 34 87 kg/m²        Physical Exam  Vitals and nursing note reviewed  Constitutional:       General: He is not in acute distress  Appearance: He is well-developed  He is not diaphoretic  HENT:      Head: Normocephalic and atraumatic  Right Ear: External ear normal       Left Ear: External ear normal       Nose: Nose normal       Mouth/Throat:      Pharynx: No oropharyngeal exudate  Eyes:      General: No scleral icterus  Right eye: No discharge  Left eye: No discharge  Pupils: Pupils are equal, round, and reactive to light  Neck:      Thyroid: No thyromegaly  Cardiovascular:      Rate and Rhythm: Normal rate  Heart sounds: Normal heart sounds  No murmur heard  Pulmonary:      Effort: Pulmonary effort is normal  No respiratory distress  Breath sounds: Normal breath sounds  No wheezing  Abdominal:      General: Bowel sounds are normal  There is no distension        Palpations: Abdomen is soft  There is no mass  Tenderness: There is no abdominal tenderness  There is no guarding or rebound  Musculoskeletal:         General: Normal range of motion  Skin:     General: Skin is warm and dry  Findings: Rash present  No erythema  Neurological:      Mental Status: He is alert        Coordination: Coordination normal       Deep Tendon Reflexes: Reflexes normal    Psychiatric:         Behavior: Behavior normal                 Margo Night, DO

## 2021-07-06 ENCOUNTER — CONSULT (OUTPATIENT)
Dept: DERMATOLOGY | Age: 41
End: 2021-07-06
Payer: COMMERCIAL

## 2021-07-06 VITALS — BODY MASS INDEX: 35.56 KG/M2 | TEMPERATURE: 97.8 F | WEIGHT: 254 LBS | HEIGHT: 71 IN

## 2021-07-06 DIAGNOSIS — L30.9 DERMATITIS: ICD-10-CM

## 2021-07-06 PROCEDURE — 99203 OFFICE O/P NEW LOW 30 MIN: CPT | Performed by: DERMATOLOGY

## 2021-07-06 NOTE — PATIENT INSTRUCTIONS
CONTACT DERMATITIS    Assessment and Plan:  Diagnosis:  Contact dermatitis  Based on a thorough discussion of this condition and the management approach to it (including a comprehensive discussion of the known risks, side effects and potential benefits of treatment), the patient (family) agrees to implement the following specific plan:    discuss eliminating fragrances  Patch testing  Continue with mometasone 0 1% cream     What is contact dermatitis? Contact dermatitis is a type of eczema that results from something coming in contact with the skin  There are 2 types:  irritant and allergic  The majority of cases are from irritation  Usually that is from contact with strong soaps, repeated exposure to water, contact with cleaning agents or food, or friction  The minority are an actual allergy  In these cases something is coming in contact with the skin and causing an allergic reaction, similar to what happens with poison ivy  This usually occurs unexpectedly after using something for many years  Even very tiny amounts of the substance on the skin can cause a reaction  Some common causes are fragrances, preservatives and metals  Sometimes this can occur when an allergic substance is eaten, as well, but most often it is from direct contact with the skin  To determine the cause of allergy a patch test is often done  Some general rules to follow for both types of contact dermatitis are:   Wear gloves when using strong cleansers or before prolonged contact with water (like washing dishes)   Use gentle cleansers and avoid strong soaps   Apply moisturizer to entire body after bathing    Avoid products with fragrance  Most often contact dermatitis is treated with topical medicines like topical steroids, eucrisa, pimecrolimus or tacrolimus     Some times oral steroids, ie prednisone, methylprednisone and prednisolone, are needed    In chronic cases, treatment options include:  light therapy, methotrexate, cyclosporin

## 2021-07-06 NOTE — PROGRESS NOTES
[facility] Note     Patient Name: Kasey Colon  Encounter Date: 7 6 21     Have you been cared for by a [facility] in the last 3 years and, if so, which one? No    · Have you traveled outside of the [address] [place] [address] in the past 3 months or outside of the [place] in the last 2 weeks? No     May we call your Preferred Phone number to discuss your specific medical information? Yes     May we leave a detailed message that includes your specific medical information? Yes      Today's Chief Concerns:   Concern #1:  rash   Concern #2:      Past Medical History:  Have you personally ever had or currently have any of the following? · Skin cancer (such as Melanoma, Basal Cell Carcinoma, Squamous Cell Carcinoma? (If Yes, please provide more detail)- No  · Eczema: No  · Psoriasis: No  · HIV/AIDS: No  · Hepatitis B or C: No  · Tuberculosis: No  · Systemic Immunosuppression such as Diabetes, Biologic or Immunotherapy, Chemotherapy, Organ Transplantation, Bone Marrow Transplantation (If YES, please provide more detail): No  · Radiation Treatment (If YES, please provide more detail): No  · Any other major medical conditions/concerns? 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 Yard, work constructuion    Family History:  Have any of your "first degree relatives" (parent, brother, sister, or child) had any of the following       · Skin cancer such as Melanoma or Merkel Cell Carcinoma or Pancreatic Cancer? No  · Eczema, Asthma, Hay Fever or Seasonal Allergies: No  · Psoriasis or Psoriatic Arthritis: No  · Do any other medical conditions seem to run in your family? If Yes, what condition and which relatives?   No    Current Medications:   (please update all dermatological medications before printing patient's AVS!)      Current Outpatient Medications:    mometasone (ELOCON) 0 1 % cream, Apply topically daily, Disp: 45 g, Rfl: 0    triamcinolone (KENALOG) 0 1 % ointment, Apply topically 2 (two) times a day, Disp: 30 g, Rfl: 0      Review of Systems:  Have you recently had or currently have any of the following? If YES, what are you doing for the problem? · Fever, chills or unintended weight loss: No  · Sudden loss or change in your vision: No  · Nausea, vomiting or blood in your stool: No  · Painful or swollen joints: No  · Wheezing or cough: No  · Changing mole or non-healing wound: No  · Nosebleeds: No  · Excessive sweating: No  · Easy or prolonged bleeding? No  · Over the last 2 weeks, how often have you been bothered by the following problems? · Taking little interest or pleasure in doing things: 1 - Not at All  · Feeling down, depressed, or hopeless: 1 - Not at All  · Rapid heartbeat with epinephrine:  No    · FEMALES ONLY:    · Are you pregnant or planning to become pregnant? N/A  · Are you currently or planning to be nursing or breast feeding? N/A    · Any known allergies? · No Known Allergies      Physical Exam:     Was a chaperone (Derm Clinical Assistant) present throughout the entire Physical Exam? Yes     Did the Dermatology Team specifically  the patient on the importance of a Full Skin Exam to be sure that nothing is missed clinically?  Yes}  o Did the patient ultimately request or accept a Full Skin Exam?  NO  o Did the patient specifically refuse to have the areas "under-the-bra" examined by the Dermatologist? No  o Did the patient specifically refuse to have the areas "under-the-underwear" examined by the Dermatologist? No    CONSTITUTIONAL:   Vitals:    07/06/21 1311   Temp: 97 8 °F (36 6 °C)   TempSrc: Probe   Weight: 115 kg (254 lb)   Height: 5' 11" (1 803 m)           PSYCH: Normal mood and affect  EYES: Normal conjunctiva  ENT: Normal lips and oral mucosa  CARDIOVASCULAR: No edema  RESPIRATORY: Normal respirations  HEME/LYMPH/IMMUNO:  No regional lymphadenopathy except as noted below in "ASSESSMENT AND PLAN BY DIAGNOSIS"    SKIN:  FULL ORGAN SYSTEM EXAM   Hair, Scalp, Ears, Face Normal except as noted below in Assessment   Neck, Cervical Chain Nodes Normal except as noted below in Assessment   Right Arm/Hand/Fingers Normal except as noted below in Assessment   Left Arm/Hand/Fingers Normal except as noted below in Assessment   Chest/Breasts/Axillae    Abdomen, Umbilicus    Back/Spine    Groin/Genitalia/Buttocks NOT EXAMINED   Right Leg, Foot, Toes    Left Leg, Foot, Toes         Assessment and Plan by Diagnosis:    History of Present Condition:     Duration:  How long has this been an issue for you?    o  over a month, rash occurs in different location of the body   Location Affected:  Where on the body is this affecting you?    o  arms posterior scalp   Quality:  Is there any bleeding, pain, itch, burning/irritation, or redness associated with the skin lesion? o  itching   Severity:  Describe any bleeding, pain, itch, burning/irritation, or redness on a scale of 1 to 10 (with 10 being the worst)  o  1   Timing:  Does this condition seem to be there pretty constantly or do you notice it more at specific times throughout the day?     o  consistent, does not totally disappear   Context:  Have you ever noticed that this condition seems to be associated with specific activities you do?    o  denies   Modifying Factors:    o Anything that seems to make the condition worse?    -  denies  o What have you tried to do to make the condition better?    -  triamcinolone 0 1% ointment, mometasone 0 1% cream   Associated Signs and Symptoms:  Does this skin lesion seem to be associated with any of the following:  o  SL AMB DERM SIGNS AND SYMPTOMS: Itching and Scratching       CONTACT DERMATITIS    Physical Exam:   Anatomic Location Affected:  Back of the neck, scalp, bilateral forearms   Morphological Description: Pink edematous plaques some papules some linear plaques   Pertinent Positives:   Pertinent Negatives: Additional History of Present Condition:  Onset 6 weeks  Has had on legs and tops of feet also, clears with 1-2 days of mometasone    Assessment and Plan:  Diagnosis:  Contact dermatitis  Based on a thorough discussion of this condition and the management approach to it (including a comprehensive discussion of the known risks, side effects and potential benefits of treatment), the patient (family) agrees to implement the following specific plan:    recommend switching to all fragrance free products and having patch testing done if the rash persists despite this, may     Continue with mometasone 0 1% cream     What is contact dermatitis? Contact dermatitis is a type of eczema that results from something coming in contact with the skin  There are 2 types:  irritant and allergic  The majority of cases are from irritation  Usually that is from contact with strong soaps, repeated exposure to water, contact with cleaning agents or food, or friction  The minority are an actual allergy  In these cases something is coming in contact with the skin and causing an allergic reaction, similar to what happens with poison ivy  This usually occurs unexpectedly after using something for many years  Even very tiny amounts of the substance on the skin can cause a reaction  Some common causes are fragrances, preservatives and metals  Sometimes this can occur when an allergic substance is eaten, as well, but most often it is from direct contact with the skin  To determine the cause of allergy a patch test is often done      Some general rules to follow for both types of contact dermatitis are:   Wear gloves when using strong cleansers or before prolonged contact with water (like washing dishes)   Use gentle cleansers and avoid strong soaps   Apply moisturizer to entire body after bathing    Avoid products with fragrance  Most often contact dermatitis is treated with topical medicines like topical steroids, eucrisa, pimecrolimus or tacrolimus     Some times oral steroids, ie prednisone, methylprednisone and prednisolone, are needed  In chronic cases, treatment options include:  light therapy, methotrexate, cyclosporin    Scribe Attestation    I,:  Susan Casillas am acting as a scribe while in the presence of the attending physician :       I,:  Renu Hickey MD personally performed the services described in this documentation    as scribed in my presence :

## 2021-07-08 ENCOUNTER — OFFICE VISIT (OUTPATIENT)
Dept: URGENT CARE | Facility: CLINIC | Age: 41
End: 2021-07-08
Payer: COMMERCIAL

## 2021-07-08 VITALS
HEART RATE: 80 BPM | TEMPERATURE: 97.8 F | DIASTOLIC BLOOD PRESSURE: 78 MMHG | WEIGHT: 251.8 LBS | RESPIRATION RATE: 18 BRPM | BODY MASS INDEX: 35.25 KG/M2 | SYSTOLIC BLOOD PRESSURE: 120 MMHG | OXYGEN SATURATION: 98 % | HEIGHT: 71 IN

## 2021-07-08 DIAGNOSIS — R21 RASH: Primary | ICD-10-CM

## 2021-07-08 PROCEDURE — 1036F TOBACCO NON-USER: CPT | Performed by: PHYSICIAN ASSISTANT

## 2021-07-08 PROCEDURE — 3008F BODY MASS INDEX DOCD: CPT | Performed by: PHYSICIAN ASSISTANT

## 2021-07-08 PROCEDURE — 87070 CULTURE OTHR SPECIMN AEROBIC: CPT | Performed by: PHYSICIAN ASSISTANT

## 2021-07-08 PROCEDURE — 99214 OFFICE O/P EST MOD 30 MIN: CPT | Performed by: PHYSICIAN ASSISTANT

## 2021-07-08 RX ORDER — PENICILLIN V POTASSIUM 500 MG/1
500 TABLET ORAL EVERY 8 HOURS SCHEDULED
Qty: 30 TABLET | Refills: 0 | Status: SHIPPED | OUTPATIENT
Start: 2021-07-08 | End: 2021-07-18

## 2021-07-08 RX ORDER — CLOBETASOL PROPIONATE 0.5 MG/G
OINTMENT TOPICAL 2 TIMES DAILY
Qty: 30 G | Refills: 0 | Status: SHIPPED | OUTPATIENT
Start: 2021-07-08 | End: 2021-08-18

## 2021-07-08 NOTE — PATIENT INSTRUCTIONS
Rash is diffuse, discussed possible causes, one being eczema vs  Guttate psoriasis  Continue to use topical steroids such as triamcinolone, prescribed clobetasol for more severe itching areas  Also, prescribed Penicillin V for 10 days to treat any underlying strep infection present and take Vitamin D supplements  Recommend increase sun exposure with naturally sun or UVB light bulbs  Caution to not over expose and burn skin  Follow up with dermatology, try and see them when there is a flare up

## 2021-07-08 NOTE — PROGRESS NOTES
[facility]        NAME: Angel Hernández is a 47 y o  male  : 1980    MRN: [id number]  DATE: 2021  TIME: 1:20 PM    Assessment and Plan   Rash [R21]  1  Rash  clobetasol (TEMOVATE) 0 05 % ointment    penicillin V potassium (VEETID) 500 mg tablet    Throat culture         Patient Instructions     Patient Instructions   Rash is diffuse, discussed possible causes, one being eczema vs  Guttate psoriasis  Continue to use topical steroids such as triamcinolone, prescribed clobetasol for more severe itching areas  Also, prescribed Penicillin V for 10 days to treat any underlying strep infection present and take Vitamin D supplements  Recommend increase sun exposure with naturally sun or UVB light bulbs  Caution to not over expose and burn skin  Follow up with dermatology, try and see them when there is a flare up  Follow up with PCP in 3-5 days  Proceed to  ER if symptoms worsen  Chief Complaint     Chief Complaint   Patient presents with    Rash     entire trunk large red circular rash seeing Dermatology         History of Present Illness       46 y/o male with a persistent red, itchy, patchy rash on his entire body  Pt first presented to this clinic 6 weeks ago with small patches of hive like rash on his neck and chest  He was given a steroid inj  In the clinic and topical steroid creams  Since the first presentation there have been intermittent flares with more diffuse spread with every flare  Pt was seen twice in this clinic, once by her family doctor and by dermatology  Dermatology dx him with Eczema 2 days ago, prescribed him a stronger topical steroid and advised him to return for allergy patch testing  Today the rash became so red, inflamed and diffuse he came in to be rechecked  It is spread now on his entire trunk, including his buttocks  There are large clumped patches on his chest  He has been using the steroid ointments with only temporary relief   Pt does not recall any sore throat, fever, cough, congestion, URI symptoms prior to the rash starting  There were no new environmental triggers such as soaps or laundry detergents  No one else at home has this rash  No travel prior to the eruption  Pt's wife is having their first baby in the next 2 weeks, he notes that has been exciting, maybe a little stressful but he is more concerned about this rash  Review of Systems   Review of Systems   Constitutional: Negative for chills, fatigue and fever  HENT: Negative for congestion, sinus pressure, sinus pain, sore throat and trouble swallowing  Respiratory: Negative for cough and shortness of breath  Cardiovascular: Negative for chest pain and palpitations  Gastrointestinal: Negative for abdominal pain, diarrhea, nausea and vomiting  Skin: Positive for rash  Neurological: Negative for dizziness, weakness and headaches  Current Medications       Current Outpatient Medications:     clobetasol (TEMOVATE) 0 05 % ointment, Apply topically 2 (two) times a day, Disp: 30 g, Rfl: 0    mometasone (ELOCON) 0 1 % cream, Apply topically daily, Disp: 45 g, Rfl: 0    penicillin V potassium (VEETID) 500 mg tablet, Take 1 tablet (500 mg total) by mouth every 8 (eight) hours for 10 days, Disp: 30 tablet, Rfl: 0    triamcinolone (KENALOG) 0 1 % ointment, Apply topically 2 (two) times a day, Disp: 30 g, Rfl: 0    Current Allergies     Allergies as of 07/08/2021    (No Known Allergies)            The following portions of the patient's history were reviewed and updated as appropriate: allergies, current medications, past family history, past medical history, past social history, past surgical history and problem list      No past medical history on file  No past surgical history on file      Family History   Problem Relation Age of Onset    No Known Problems Mother     Hyperlipidemia Father     Prostate cancer Maternal Grandfather     Colon cancer Maternal Grandfather     Alzheimer's disease Paternal Grandmother     Alcohol abuse Paternal Grandfather          in early     Mental illness Neg Hx          Medications have been verified  Objective   /78   Pulse 80   Temp 97 8 °F (36 6 °C)   Resp 18   Ht 5' 11" (1 803 m)   Wt 114 kg (251 lb 12 8 oz)   SpO2 98%   BMI 35 12 kg/m²        Physical Exam     Physical Exam  Vitals and nursing note reviewed  Constitutional:       Appearance: Normal appearance  HENT:      Mouth/Throat:      Mouth: Mucous membranes are moist       Pharynx: Oropharynx is clear  No posterior oropharyngeal erythema  Eyes:      Extraocular Movements: Extraocular movements intact  Pupils: Pupils are equal, round, and reactive to light  Cardiovascular:      Rate and Rhythm: Normal rate and regular rhythm  Pulses: Normal pulses  Heart sounds: Normal heart sounds  Pulmonary:      Effort: Pulmonary effort is normal       Breath sounds: Normal breath sounds  Skin:     Findings: Erythema and rash present  Rash is papular and scaling  Comments: Diffuse full body, erythematous, nummular/teardrop with raised borders, patchy, rash  No plaques noted  Neurological:      Mental Status: He is alert and oriented to person, place, and time

## 2021-07-11 LAB — BACTERIA THROAT CULT: ABNORMAL

## 2021-08-17 DIAGNOSIS — L30.9 DERMATITIS: ICD-10-CM

## 2021-08-17 DIAGNOSIS — R21 RASH: ICD-10-CM

## 2021-08-18 RX ORDER — CLOBETASOL PROPIONATE 0.5 MG/G
OINTMENT TOPICAL
Qty: 30 G | Refills: 0 | Status: SHIPPED | OUTPATIENT
Start: 2021-08-18 | End: 2021-11-30 | Stop reason: SDUPTHER

## 2021-08-19 RX ORDER — MOMETASONE FUROATE 1 MG/G
CREAM TOPICAL
Qty: 45 G | Refills: 0 | Status: SHIPPED | OUTPATIENT
Start: 2021-08-19 | End: 2021-11-30 | Stop reason: SDUPTHER

## 2021-08-27 ENCOUNTER — OFFICE VISIT (OUTPATIENT)
Dept: FAMILY MEDICINE CLINIC | Facility: CLINIC | Age: 41
End: 2021-08-27
Payer: COMMERCIAL

## 2021-08-27 VITALS
DIASTOLIC BLOOD PRESSURE: 74 MMHG | HEIGHT: 71 IN | RESPIRATION RATE: 14 BRPM | WEIGHT: 250 LBS | OXYGEN SATURATION: 96 % | SYSTOLIC BLOOD PRESSURE: 110 MMHG | TEMPERATURE: 96 F | HEART RATE: 83 BPM | BODY MASS INDEX: 35 KG/M2

## 2021-08-27 DIAGNOSIS — R21 RASH AND NONSPECIFIC SKIN ERUPTION: Primary | ICD-10-CM

## 2021-08-27 PROCEDURE — 1036F TOBACCO NON-USER: CPT | Performed by: FAMILY MEDICINE

## 2021-08-27 PROCEDURE — 3725F SCREEN DEPRESSION PERFORMED: CPT | Performed by: FAMILY MEDICINE

## 2021-08-27 PROCEDURE — 99213 OFFICE O/P EST LOW 20 MIN: CPT | Performed by: FAMILY MEDICINE

## 2021-08-27 NOTE — PROGRESS NOTES
Assessment/Plan:    1  Rash and nonspecific skin eruption  -     JENIFER Screen w/ Reflex to Titer/Pattern; Future  -     Lupus anticoagulant; Future  -     C-reactive protein; Future    Pt does not actually have the rash at the momet - he did have pics - numular patches in different areas   In the diff could be lupus, looks like it could have been allergic as well  PT was seen by lucia, note reviewed, he was reassured she is top of it, not that he had any doubt though  I iwll add a few labs and will follow        There are no Patient Instructions on file for this visit  No follow-ups on file  Subjective:      Patient ID: Trenton Grady is a 47 y o  male  Chief Complaint   Patient presents with    Follow-up     med refill       Pt states he had a skin condition starting in may - went to urgent care, was given steroid shot improved , did not go away - went back took abx  Pt states he was sent to derm, pt was seen by derm - pt was advised to do a opatch test   Patch test was sched  Pt states he has it next week  Had a flare up  Went back to urgent care was told it was psoriasis - he did a strep culture and thios came back positive, he was placed on puills and still getting is since then  Flares every 5-8 days   Can be anywhere   Itchy and uncomfortable  The following portions of the patient's history were reviewed and updated as appropriate: allergies, current medications, past family history, past medical history, past social history, past surgical history and problem list     Review of Systems   Constitutional: Negative for activity change, appetite change, chills, diaphoresis, fatigue, fever and unexpected weight change  HENT: Negative for congestion, dental problem, ear pain, mouth sores, sinus pressure, sinus pain, sore throat and trouble swallowing  Eyes: Negative for photophobia, discharge and itching  Respiratory: Negative for apnea, chest tightness and shortness of breath  Cardiovascular: Negative for chest pain, palpitations and leg swelling  Gastrointestinal: Negative for abdominal distention, abdominal pain, blood in stool, nausea and vomiting  Endocrine: Negative for cold intolerance, heat intolerance, polydipsia, polyphagia and polyuria  Genitourinary: Negative for difficulty urinating  Musculoskeletal: Negative for arthralgias  Skin: Positive for rash  Negative for color change and wound  Neurological: Negative for dizziness, syncope, speech difficulty and headaches  Hematological: Negative for adenopathy  Psychiatric/Behavioral: Negative for agitation and behavioral problems  Current Outpatient Medications   Medication Sig Dispense Refill    clobetasol (TEMOVATE) 0 05 % ointment APPLY TWO TIMES A DAY (GENERIC FOR TEMOVATE) 30 g 0    mometasone (ELOCON) 0 1 % cream APPLY TOPICALLY DAILY (GENERIC FOR ELOCON) 45 g 0     No current facility-administered medications for this visit  Objective:    /74   Pulse 83   Temp (!) 96 °F (35 6 °C)   Resp 14   Ht 5' 11" (1 803 m)   Wt 113 kg (250 lb)   SpO2 96%   BMI 34 87 kg/m²        Physical Exam  Vitals and nursing note reviewed  Constitutional:       General: He is not in acute distress  Appearance: He is well-developed  He is not diaphoretic  HENT:      Head: Normocephalic and atraumatic  Right Ear: External ear normal       Left Ear: External ear normal       Nose: Nose normal       Mouth/Throat:      Pharynx: No oropharyngeal exudate  Eyes:      General: No scleral icterus  Right eye: No discharge  Left eye: No discharge  Pupils: Pupils are equal, round, and reactive to light  Neck:      Thyroid: No thyromegaly  Cardiovascular:      Rate and Rhythm: Normal rate  Heart sounds: Normal heart sounds  No murmur heard  Pulmonary:      Effort: Pulmonary effort is normal  No respiratory distress  Breath sounds: Normal breath sounds  No wheezing  Abdominal:      General: Bowel sounds are normal  There is no distension  Palpations: Abdomen is soft  There is no mass  Tenderness: There is no abdominal tenderness  There is no guarding or rebound  Musculoskeletal:         General: Normal range of motion  Skin:     General: Skin is warm and dry  Findings: Rash present  No erythema  Neurological:      Mental Status: He is alert        Coordination: Coordination normal       Deep Tendon Reflexes: Reflexes normal    Psychiatric:         Behavior: Behavior normal                 Jean Trevino DO

## 2021-08-30 ENCOUNTER — OFFICE VISIT (OUTPATIENT)
Dept: DERMATOLOGY | Facility: CLINIC | Age: 41
End: 2021-08-30
Payer: COMMERCIAL

## 2021-08-30 ENCOUNTER — LAB (OUTPATIENT)
Dept: LAB | Facility: CLINIC | Age: 41
End: 2021-08-30
Payer: COMMERCIAL

## 2021-08-30 VITALS — HEIGHT: 71 IN | WEIGHT: 249.6 LBS | TEMPERATURE: 97.2 F | BODY MASS INDEX: 34.94 KG/M2

## 2021-08-30 DIAGNOSIS — R21 RASH AND NONSPECIFIC SKIN ERUPTION: ICD-10-CM

## 2021-08-30 DIAGNOSIS — L25.9 CONTACT DERMATITIS, UNSPECIFIED CONTACT DERMATITIS TYPE, UNSPECIFIED TRIGGER: Primary | ICD-10-CM

## 2021-08-30 DIAGNOSIS — Z13.6 SCREENING FOR CARDIOVASCULAR CONDITION: ICD-10-CM

## 2021-08-30 DIAGNOSIS — L30.9 DERMATITIS: ICD-10-CM

## 2021-08-30 LAB
ALBUMIN SERPL BCP-MCNC: 4.1 G/DL (ref 3.5–5)
ALP SERPL-CCNC: 71 U/L (ref 46–116)
ALT SERPL W P-5'-P-CCNC: 45 U/L (ref 12–78)
ANION GAP SERPL CALCULATED.3IONS-SCNC: 10 MMOL/L (ref 4–13)
AST SERPL W P-5'-P-CCNC: 19 U/L (ref 5–45)
BILIRUB SERPL-MCNC: 0.49 MG/DL (ref 0.2–1)
BUN SERPL-MCNC: 13 MG/DL (ref 5–25)
CALCIUM SERPL-MCNC: 9 MG/DL (ref 8.3–10.1)
CHLORIDE SERPL-SCNC: 106 MMOL/L (ref 100–108)
CHOLEST SERPL-MCNC: 259 MG/DL (ref 50–200)
CO2 SERPL-SCNC: 24 MMOL/L (ref 21–32)
CREAT SERPL-MCNC: 0.87 MG/DL (ref 0.6–1.3)
CRP SERPL QL: 10.3 MG/L
GFR SERPL CREATININE-BSD FRML MDRD: 109 ML/MIN/1.73SQ M
GLUCOSE P FAST SERPL-MCNC: 97 MG/DL (ref 65–99)
HDLC SERPL-MCNC: 63 MG/DL
LDLC SERPL CALC-MCNC: 172 MG/DL (ref 0–100)
POTASSIUM SERPL-SCNC: 4.2 MMOL/L (ref 3.5–5.3)
PROT SERPL-MCNC: 7.8 G/DL (ref 6.4–8.2)
SODIUM SERPL-SCNC: 140 MMOL/L (ref 136–145)
TRIGL SERPL-MCNC: 121 MG/DL

## 2021-08-30 PROCEDURE — 85670 THROMBIN TIME PLASMA: CPT

## 2021-08-30 PROCEDURE — 3008F BODY MASS INDEX DOCD: CPT | Performed by: FAMILY MEDICINE

## 2021-08-30 PROCEDURE — 82785 ASSAY OF IGE: CPT

## 2021-08-30 PROCEDURE — 95044 PATCH/APPLICATION TESTS: CPT | Performed by: DERMATOLOGY

## 2021-08-30 PROCEDURE — 86140 C-REACTIVE PROTEIN: CPT

## 2021-08-30 PROCEDURE — 36415 COLL VENOUS BLD VENIPUNCTURE: CPT

## 2021-08-30 PROCEDURE — 86038 ANTINUCLEAR ANTIBODIES: CPT

## 2021-08-30 PROCEDURE — 85732 THROMBOPLASTIN TIME PARTIAL: CPT

## 2021-08-30 PROCEDURE — 86003 ALLG SPEC IGE CRUDE XTRC EA: CPT

## 2021-08-30 PROCEDURE — 85705 THROMBOPLASTIN INHIBITION: CPT

## 2021-08-30 PROCEDURE — 85613 RUSSELL VIPER VENOM DILUTED: CPT

## 2021-08-30 PROCEDURE — 80061 LIPID PANEL: CPT

## 2021-08-30 PROCEDURE — 80053 COMPREHEN METABOLIC PANEL: CPT

## 2021-08-30 NOTE — PROGRESS NOTES
[facility] Follow Up Note    Patient Name: Kasey Colon  Encounter Date: 8 30 21    Today's Chief Concerns:  [facility] Concern #1:  Patch test #1   Concern #2:    [facility] Concern #3:      Current Medications:    Current Outpatient Medications:     clobetasol (TEMOVATE) 0 05 % ointment, APPLY TWO TIMES A DAY (GENERIC FOR TEMOVATE), Disp: 30 g, Rfl: 0    mometasone (ELOCON) 0 1 % cream, APPLY TOPICALLY DAILY (GENERIC FOR ELOCON), Disp: 45 g, Rfl: 0    CONSTITUTIONAL:   Vitals:    08/30/21 1004   Temp: (!) 97 2 °F (36 2 °C)   TempSrc: Temporal   Weight: 113 kg (249 lb 9 6 oz)   Height: 5' 11" (1 803 m)             Specific Alerts:    Have you been seen by a [facility] Dermatologist in the last 3 years? YES    Are you pregnant or planning to become pregnant? N/A    Are you currently or planning to be nursing or breast feeding? N/A    No Known Allergies    May we call your Preferred Phone number to discuss your specific medical information? YES    May we leave a detailed message that includes your specific medical information? YES    Have you traveled outside of the [facility] in the past 3 months? No    Do you currently have a pacemaker or defibrillator? No    Do you have any artificial heart valves, joints, plates, screws, rods, stents, pins, etc? No   - If Yes, were any placed within the last 2 years? Do you require any medications prior to a surgical procedure? No   - If Yes, for which procedure? - If Yes, what medications to you require? Are you taking any medications that cause you to bleed more easily ("blood thinners") No    Have you ever experienced a rapid heartbeat with epinephrine? No    Have you ever been treated with "gold" (gold sodium thiomalate) therapy? No    [facility] can help with wrinkles, "laugh lines," facial volume loss, "double chin," "love handles," age spots, and more   Are you interested in learning today about some of the skin enhancement procedures that we offer? (If Yes, please provide more detail) No    Review of Systems:  Have you recently had or currently have any of the following?     · Fever or chills: No  · Night Sweats: No  · Headaches: No  · Weight Gain: No  · Weight Loss: No  · Blurry Vision: No  · Nausea: No  · Vomiting: No  · Diarrhea: No  · Blood in Stool: No  · Abdominal Pain: No  · Itchy Skin: YES  · Painful Joints: No  · Swollen Joints: No  · Muscle Pain: No  · Irregular Mole: No  · Sun Burn: No  · Dry Skin: No  · Skin Color Changes: YES  · Scar or Keloid: No  · Cold Sores/Fever Blisters: No  · Bacterial Infections/MRSA: No  · Anxiety: No  · Depression: No  · Suicidal or Homicidal Thoughts: No      PSYCH: Normal mood and affect  EYES: Normal conjunctiva  ENT: Normal lips and oral mucosa  CARDIOVASCULAR: No edema  RESPIRATORY: Normal respirations  HEME/LYMPH/IMMUNO:  No regional lymphadenopathy except as noted below in ASSESSMENT AND PLAN BY DIAGNOSIS    FULL ORGAN SYSTEM SKIN EXAM (SKIN)   Hair, Scalp, Ears, Face Normal except as noted below in Assessment   Neck, Cervical Chain Nodes Normal except as noted below in Assessment   Right Arm/Hand/Fingers Normal except as noted below in Assessment   Left Arm/Hand/Fingers Normal except as noted below in Assessment   Chest/Breasts/Axillae Viewed areas Normal except as noted below in Assessment   Abdomen, Umbilicus Normal except as noted below in Assessment   Back/Spine Normal except as noted below in Assessment   Groin/Genitalia/Buttocks Viewed areas Normal except as noted below in Assessment   Right Leg, Foot, Toes Normal except as noted below in Assessment   Left Leg, Foot, Toes Normal except as noted below in Assessment       PATCH TEST DAY 1    Assessment and Plan:   Area of body affected:  Back of the neck, scalp, bilateral forearms   Prior treatment: triamcinolone 0 1% ointment, mometasone 0 1% cream   Indication for patch test: contact dermatitis    Plan is to patch test using T  R U E Test   Allergic contact dermatitis patch testing was explained to the patient  The patient understands that this testing is only a test, not a treatment  Therefore, avoidance of any allergen is the key to improvement of the eruption if an allergy is found  Additionally, the patient understands that there is a possibility of a negative test as there are over 4,300 known allergens and it is impossible to test for everything so we will test for the more common causes that can cause this pattern of pruritis   Recommended no showering, sweating or excessive moisture as this reduces the effectiveness of the test   Also, no oral steroids and do not apply topical steroids to the testing field   The patient understands that they must come to the clinic on Monday to have the patches placed, Wednesday to have the patches removed and an initial read performed,  and Friday of the testing week for the final reading  PROCEDURE: PATCH TEST APPLICATION    Total number of patches applied: 36 individual patches    The first panel was placed on the upper left side of patient's back with 1 3 marked in the upper left corner  The entire patch was smoothed, making sure each chamber made adequate contact with the skin  Grid 1 3 was placed over applied panel, a surgical marker was used to appropriately janina notches on skin  Hypafix was applied over patches  This was repeated for patches 1 2 and 1 3  PATIENT INSTRUCTIONS     After your patch tests are applied, you will need to return in two days (48 hours) to have your patch-test sites read  The appointments should have been made at the time your initial appointment was scheduled  Please do not take a bath or get your back wet until after you have completed all your patch test visits  Do not get your back wet between the time the patch tests are removed and the time of your final visit       Please do not take part in any strenuous activities that will loosen the tape on your back or cause you to perspire heavily  If the tape becomes loose, it may be reinforced with a medical tape from your home  If one or more patches hurt or become very itchy (more so than the others), they may be cut out and brought in separately  Ordinarily, this is not necessary  Leave the patches alone if you feel a generalized itch from the tape and cannot pinpoint exactly which patch is causing the discomfort  You may take an antihistamine for the itching, such as Benadryl  You may want to wear an OLD undershirt to prevent the adhesives and/or patch test substances from sticking to or staining your clothes during and after the patch tests are removed  Please call the patch test nurse at: [phone] to report any reactions occurring after your final patch test appointment  NOTE: initial visit presented with eczematous eruption but urticaria at time of second visit for patch test   Eczema may be secondary to scratching areas of urticaria  If patch test negative then refer to allergist for IgE testing    Instructed to take claritin and zyrtec each twice daily for urticaria  Scribe Attestation    I,:  Kevyn Kaiser am acting as a scribe while in the presence of the attending physician :       I,:  Veronica Davis MD personally performed the services described in this documentation    as scribed in my presence :

## 2021-08-31 LAB
A ALTERNATA IGE QN: <0.1 KUA/I
A FUMIGATUS IGE QN: <0.1 KUA/I
ALLERGEN COMMENT: ABNORMAL
ALLERGEN COMMENT: NORMAL
ALMOND IGE QN: <0.1 KUA/I
BERMUDA GRASS IGE QN: <0.1 KUA/I
BOXELDER IGE QN: <0.1 KUA/I
C HERBARUM IGE QN: <0.1 KUA/I
CASHEW NUT IGE QN: <0.1 KUA/I
CAT DANDER IGE QN: <0.1 KUA/I
CMN PIGWEED IGE QN: <0.1 KUA/I
CODFISH IGE QN: <0.1 KUA/I
COMMON RAGWEED IGE QN: <0.1 KUA/I
COTTONWOOD IGE QN: <0.1 KUA/I
D FARINAE IGE QN: <0.1 KUA/I
D PTERONYSS IGE QN: <0.1 KUA/I
DOG DANDER IGE QN: <0.1 KUA/I
EGG WHITE IGE QN: <0.1 KUA/I
GLUTEN IGE QN: <0.1 KUA/I
HAZELNUT IGE QN: <0.1 KUA/L
LONDON PLANE IGE QN: <0.1 KUA/I
MILK IGE QN: <0.1 KUA/I
MOUSE URINE PROT IGE QN: <0.1 KUA/I
MT JUNIPER IGE QN: <0.1 KUA/I
MUGWORT IGE QN: <0.1 KUA/I
P NOTATUM IGE QN: <0.1 KUA/I
PEANUT IGE QN: <0.1 KUA/I
ROACH IGE QN: <0.1 KUA/I
SALMON IGE QN: <0.1 KUA/I
SCALLOP IGE QN: <0.1 KUA/L
SESAME SEED IGE QN: <0.1 KUA/I
SHEEP SORREL IGE QN: <0.1 KUA/I
SHRIMP IGE QN: <0.1 KUA/L
SILVER BIRCH IGE QN: <0.1 KUA/I
SOYBEAN IGE QN: <0.1 KUA/I
TIMOTHY IGE QN: 0.42 KUA/I
TOTAL IGE SMQN RAST: 61.9 KU/L (ref 0–113)
TOTAL IGE SMQN RAST: 66.4 KU/L (ref 0–113)
TUNA IGE QN: <0.1 KUA/I
WALNUT IGE QN: <0.1 KUA/I
WALNUT IGE QN: <0.1 KUA/I
WHEAT IGE QN: <0.1 KUA/I
WHITE ASH IGE QN: <0.1 KUA/I
WHITE ELM IGE QN: <0.1 KUA/I
WHITE MULBERRY IGE QN: <0.1 KUA/I
WHITE OAK IGE QN: <0.1 KUA/I

## 2021-09-01 ENCOUNTER — OFFICE VISIT (OUTPATIENT)
Dept: DERMATOLOGY | Facility: CLINIC | Age: 41
End: 2021-09-01

## 2021-09-01 DIAGNOSIS — L25.9 CONTACT DERMATITIS, UNSPECIFIED CONTACT DERMATITIS TYPE, UNSPECIFIED TRIGGER: Primary | ICD-10-CM

## 2021-09-01 DIAGNOSIS — L30.9 DERMATITIS: ICD-10-CM

## 2021-09-01 LAB — RYE IGE QN: NEGATIVE

## 2021-09-01 PROCEDURE — RECHECK: Performed by: DERMATOLOGY

## 2021-09-01 NOTE — PROGRESS NOTES
PATCH TEST DAY 2: NURSE VISIT ONLY    Physical Exam   Observation that tape stayed on correctly: YES   Itching or burning where allergens were placed: YES; patient reported burning sensation       Additional History of Present Condition:  Patient is present to follow up on patch removal     Assessment and Plan:  Based on a thorough discussion of this condition and the management approach to it (including a comprehensive discussion of the known risks, side effects and potential benefits of treatment), the patient (family) agrees to implement the following specific plan:   Patches and tape were removed, marking were identified and darkened with surgical marking pen, all patient questions were answered

## 2021-09-01 NOTE — RESULT ENCOUNTER NOTE
Other than Faisal grass your allergy panel is fine, you lipids are quite elevated however please make an appt to discuss

## 2021-09-02 LAB
APTT SCREEN TO CONFIRM RATIO: 1.07 RATIO (ref 0–1.4)
CONFIRM APTT/NORMAL: 36.9 SEC (ref 0–55)
LA PPP-IMP: NORMAL
SCREEN APTT: 31.9 SEC (ref 0–51.9)
SCREEN DRVVT: 35 SEC (ref 0–47)
THROMBIN TIME: 17 SEC (ref 0–23)

## 2021-09-03 ENCOUNTER — OFFICE VISIT (OUTPATIENT)
Dept: DERMATOLOGY | Facility: CLINIC | Age: 41
End: 2021-09-03
Payer: COMMERCIAL

## 2021-09-03 DIAGNOSIS — L50.9 URTICARIA: Primary | ICD-10-CM

## 2021-09-03 PROCEDURE — 1036F TOBACCO NON-USER: CPT | Performed by: DERMATOLOGY

## 2021-09-03 PROCEDURE — 88312 SPECIAL STAINS GROUP 1: CPT | Performed by: PATHOLOGY

## 2021-09-03 PROCEDURE — 99213 OFFICE O/P EST LOW 20 MIN: CPT | Performed by: DERMATOLOGY

## 2021-09-03 PROCEDURE — 88305 TISSUE EXAM BY PATHOLOGIST: CPT | Performed by: PATHOLOGY

## 2021-09-03 PROCEDURE — 88342 IMHCHEM/IMCYTCHM 1ST ANTB: CPT | Performed by: PATHOLOGY

## 2021-09-03 RX ORDER — FEXOFENADINE HCL 180 MG/1
180 TABLET ORAL DAILY
COMMUNITY

## 2021-09-03 RX ORDER — LORATADINE 10 MG/1
10 TABLET ORAL DAILY
COMMUNITY

## 2021-09-03 RX ORDER — CETIRIZINE HYDROCHLORIDE 10 MG/1
10 TABLET ORAL DAILY
COMMUNITY
End: 2022-01-06

## 2021-09-03 NOTE — PROGRESS NOTES
[facility] Follow Up Note    Patient Name: Faisal Haynes  Encounter Date: 9/3/21    Today's Chief Concerns:  [facility] Concern #1:  Read patch test       Current Medications:    Current Outpatient Medications:     clobetasol (TEMOVATE) 0 05 % ointment, APPLY TWO TIMES A DAY (GENERIC FOR TEMOVATE), Disp: 30 g, Rfl: 0    mometasone (ELOCON) 0 1 % cream, APPLY TOPICALLY DAILY (GENERIC FOR ELOCON), Disp: 45 g, Rfl: 0    CONSTITUTIONAL:   There were no vitals filed for this visit  Specific Alerts:    Have you been seen by a [facility] Dermatologist in the last 3 years? YES      No Known Allergies    May we call your Preferred Phone number to discuss your specific medical information? YES    May we leave a detailed message that includes your specific medical information? YES    Have you traveled outside of the [facility] in the past 3 months? No    Do you currently have a pacemaker or defibrillator? No    Do you have any artificial heart valves, joints, plates, screws, rods, stents, pins, etc? No   - If Yes, were any placed within the last 2 years? Do you require any medications prior to a surgical procedure? No   - If Yes, for which procedure? - If Yes, what medications to you require? Are you taking any medications that cause you to bleed more easily ("blood thinners") No    Have you ever experienced a rapid heartbeat with epinephrine? No        Review of Systems:  Have you recently had or currently have any of the following?     · Fever or chills: No  · Night Sweats: No  · Headaches: No  · Weight Gain: No  · Weight Loss: No  · Blurry Vision: No  · Nausea: No  · Vomiting: No  · Diarrhea: No  · Blood in Stool: No  · Abdominal Pain: No  · Itchy Skin: YES  · Painful Joints: No  · Swollen Joints: No  · Muscle Pain: No  · Irregular Mole: No  · Sun Burn: No  · Dry Skin: No  · Skin Color Changes: No  · Scar or Keloid: No  · Cold Sores/Fever Blisters: No  · Bacterial Infections/MRSA: No  · Anxiety: No  · Depression: No  · Suicidal or Homicidal Thoughts: No      PSYCH: Normal mood and affect  EYES: Normal conjunctiva  ENT: Normal lips and oral mucosa  CARDIOVASCULAR: No edema  RESPIRATORY: Normal respirations  HEME/LYMPH/IMMUNO:  No regional lymphadenopathy except as noted below in ASSESSMENT AND PLAN BY DIAGNOSIS    FULL ORGAN SYSTEM SKIN EXAM (SKIN)   Scalp, Ears, Face Normal except as noted below in Assessment   Neck Normal except as noted below in Assessment   Right Arm/Hand/Fingers Normal except as noted below in Assessment   Left Arm/Hand/Fingers Normal except as noted below in Assessment   Chest/Breasts/Axillae Viewed areas Normal except as noted below in Assessment   Abdomen, Umbilicus Normal except as noted below in Assessment   Back/Spine Normal except as noted below in Assessment   Right Leg Normal except as noted below in Assessment   Left Leg Normal except as noted below in Assessment       PATCH TEST DAY 3      Patch Positive  During this previous week, the patient was patch tested to the TRUE TEST  Upon review, the patient had positive reactions to:          Assessment and Plan:    Negative Patch Test    We explained the interpretation of the results to the patient and provided the patient with a semi-comprehensive list of appropriate products that they could use, while avoiding the allergens most effectively  Once again, we explained that the patch testing was only a test, and that the best outcome for the patient, would happen only if they adhered to the results found today  Patient was provided information sheets regarding the common and not so common locations of each positive allergen, which should be avoided  Any products that goes on patient's skin should be carefully reviewed, and if the chemicals or their potential cross-reactors (listed) are present, then these products should not be used       Patch Negative     During this previous week, the patient was patch tested to the [employer] [facility] D[facility]atitis standard series  Upon review, the patient had no positive reactions  We explained the interpretation of the results and that patient does not appear to be allergic to the products tested  Additionally, the patient understands that there is a possibility of a negative test as there are over 4,300 known allergens and it is impossible to test for everything but testing was performed for the more common causes that can cause this pattern of pruritus  URTICARIA ("ACUTE")/VERSES URTICARIA VASCULITIS    Physical Exam:   Anatomic Location Affected:  Trunk and extremities   Morphological Description:  well demarcated erythematous plaques       Pertinent Positives:   Pertinent Negatives: Additional History of Present Condition:  Patient states the rash tends to come and go  Will last about a day or two  Just started taking Zyrtec, Claritin, and Allegra  Assessment and Plan:  Based on a thorough discussion of this condition and the management approach to it (including a comprehensive discussion of the known risks, side effects and potential benefits of treatment), the patient (family) agrees to implement the following specific plan:  · Take 1 Allergra 180 mg in the morning and 1 Zyrtex at bedtime and if no improvement should increase to 1 Allegra 180 mg in the morning and 2 Zyrtec at bedtime  Advised to be careful of being to sleepy  What is urticaria? Urticaria is characterised by weals (hives) or angioedema (swellings, in 10%) or both (in 40%)  There are several types of urticaria  The name urticaria is derived from the common [place] stinging nettle 'Urtica dioica'  A weal (or wheal) is a superficial skin-coloured or pale skin swelling, usually surrounded by erythema (redness) that lasts anything from a few minutes to 24 hours  Usually very itchy, it may have a burning sensation      Angioedema is deeper swelling within the skin or mucous membranes and can be skin-coloured or red  It resolves within 72 hours  Angioedema may be itchy or painful but is often asymptomatic  What is acute urticaria? Acute urticaria is urticaria, with or without angioedema, that is present for less than 6 weeks  It is often gone within hours to days  Who gets acute urticaria? One in five children or adults has an episode of acute urticaria during their lifetime  It is more common in atopic individuals  It affects all races and both sexes  What are the clinical features of acute urticaria? Urticarial weals can be a few millimeters or several centimeters in diameter, colored white or red, with or without a red flare  Each weal may last a few minutes or several hours and may change shape  Weals may be round, or form rings, a map-like pattern, targetoid lesions, or giant patches  Acute urticaria can affect any site of the body and tends to be distributed widely  Angioedema is more often localised  It commonly affects the face (especially eyelids and perioral sites), hands, feet and genitalia  It may involve tongue, uvula, soft palate, larynx  Serum sickness due to blood transfusion and serum sickness-like reactions due to certain drugs cause acute urticaria leaving bruises, fever, swollen lymph glands, joint pain and swelling  What causes acute urticaria? Weals are due to release of chemical mediators from tissue mast cells and circulating basophils  These chemical mediators include histamine, platelet-activating factor and cytokines  The mediators activate sensory nerves and cause dilation of blood vessels and leakage of fluid into surrounding tissues  Bradykinin release causes angioedema  Several hypotheses have been proposed to explain urticaria  The immune, arachidonic acid and coagulation systems are involved, and genetic mutations are under investigation    Serum sickness and serum sickness-like reactions are due to immune complex deposition in affected tissues  Acute urticaria can be induced by the following factors but the cause is not always identified   Acute viral infection -- an upper respiratory infection, viral hepatitis, infectious mononucleosis, mycoplasma    Acute bacterial infection -- a dental abscess, sinusitis    Food allergy (IgE mediated) -- usually milk, egg, peanut, shellfish    Drug allergy (IgE mediated) -- often an antibiotic    Drug pseudoallergy -- aspirin, nonselective nonsteroidal anti-inflammatory drugs, opiates, radiocontrast media; these cause urticaria without immune activation    Vaccination    Bee or wasp stings     Widespread reaction following localized contact urticaria -- rubber latex  Severe allergic urticaria may lead to anaphylactic shock (bronchospasm, collapse)  A single episode or recurrent episodes of angioedema without urticaria can be due to an angiotensin-converting enzyme (ACE) inhibitor drug  How is acute urticaria diagnosed? Acute urticaria is diagnosed in people with a short history of weals that last less than 24 hours, with or without angioedema  A thorough physical examination should be undertaken to look for underlying causes  Skin prick tests and radioallergosorbent tests (RAST) or CAP fluoroimmunoassay may be requested if a drug or food allergy is suspected in acute urticaria  Biopsy of urticaria can be non-specific and difficult to interpret  The pathology shows oedema in the dermis and dilated blood vessels, with a variable mixed inflammatory infiltrate  Vessel-wall damage indicates urticarial vasculitis  What is the treatment for acute urticaria? The main treatment for acute urticaria in adults and in children is with an oral second-generation antihistamine chosen from the list below  If the standard dose (eg 10 mg for cetirizine) is not effective, the dose can be increased fourfold (eg 40 mg cetirizine daily)   They are best taken continuously rather than on demand  They are stopped when the acute urticaria has settled down  There is not thought to be any benefit from adding a second antihistamine   Cetirizine    Loratadine    Fexofenadine    Desloratadine    Levocetirizine    Rupatadine    Bilastine  Terfenadine and astemizole should not be used as they are cardiotoxic in combination with ketoconazole or erythromycin  They are no longer available in [place]  Although systemic treatment is best avoided during pregnancy and breastfeeding, there have been no reports that second-generation antihistamines cause birth defects  If treatment is required, loratadine and cetirizine are currently preferred  Conventional first-generation antihistamines such as promethazine or chlorpheniramine are no longer recommended for urticaria  Avoidance of trigger factors  In addition to antihistamines, the cause of urticaria should be eliminated if known (eg drug or food allergy)  Avoidance of relevant type 1 (IgE-mediated) allergens clears urticaria within 48 hours  In addition to antihistamines, the triggers for urticaria should be avoided where possible  For example:   Avoid aspirin, opiates and nonsteroidal anti-inflammatory drugs (paracetamol is generally safe)   Avoid known allergies that have been confirmed by positive specific IgE/skin prick tests if these have clinical relevance for urticaria   Cool the affected area with a fan, cold flannel, ice pack or soothing moisturising lotion     Treatment of refractory acute urticaria  If non-sedating antihistamines are not effective, a 4 to 5-day course of oral prednisone (prednisolone) may be warranted in severe acute urticaria, particularly if there is angioedema  Systemic steroids do not speed up the resolution of symptoms  Intramuscular injection of adrenaline (epinephrine) is reserved for life-threatening anaphylaxis or swelling of the throat      PROCEDURE NOTE:  PUNCH BIOPSY      Performing Physician:  Yareli Varela    Anatomic Location; Clinical Description with size (cm); Pre-Op Diagnosis:    Specimen A  Left abdomen; skin; punch biopsy; well demarcated erythematous plaques        Anesthesia: 1% xylocaine with epi       Topical anesthesia: None       Indications: To indicate diagnosis and management plan  Procedure Details     Patient informed of the risks (including bleeding,scaring and infection) and benefits of the procedure explained  Verbal and written informed consent obtained  The area was prepped and draped in the usual fashion  Anesthesia was obtained with 1% lidocaine with epinephrine  The skin was then stretched perpendicular to the skin tension lines and a punch biopsy to an appropriate sampling depth was obtained with a 3 mm punch with a forceps and iris scissors  Hemostasis was obtained with 4-0 Ethilon x 1 suture  Complications:  None      Specimen has been sent for review by Dermatopathology  Plan:  1  Instructed to keep the wound dry and covered for 24-48h and clean thereafter  2  Warning signs of infection were reviewed  3  Recommended that the patient use acetaminophen as needed for pain  4  Sutures if any should be removed in 10 days      Standard post-procedure care has been explained and has been included in written form within the patient's copy of Informed Consent          Scribe Attestation    I,:  Jodi Fry MA am acting as a scribe while in the presence of the attending physician :       I,:  Ida Hastings MD personally performed the services described in this documentation    as scribed in my presence :

## 2021-09-03 NOTE — PATIENT INSTRUCTIONS
PATCH TEST DAY 3      Patch Positive  During this previous week, the patient was patch tested to the TRUE TEST  Upon review, the patient had positive reactions to:          Assessment and Plan:    Negative Patch Test    We explained the interpretation of the results to the patient and provided the patient with a semi-comprehensive list of appropriate products that they could use, while avoiding the allergens most effectively  Once again, we explained that the patch testing was only a test, and that the best outcome for the patient, would happen only if they adhered to the results found today  Patient was provided information sheets regarding the common and not so common locations of each positive allergen, which should be avoided  Any products that goes on patient's skin should be carefully reviewed, and if the chemicals or their potential cross-reactors (listed) are present, then these products should not be used  Patch Negative     During this previous week, the patient was patch tested to the [employer] [facility] standard series  Upon review, the patient had no positive reactions  We explained the interpretation of the results and that patient does not appear to be allergic to the products tested  Additionally, the patient understands that there is a possibility of a negative test as there are over 4,300 known allergens and it is impossible to test for everything but testing was performed for the more common causes that can cause this pattern of pruritus      URTICARIA ("ACUTE")        Assessment and Plan:  Based on a thorough discussion of this condition and the management approach to it (including a comprehensive discussion of the known risks, side effects and potential benefits of treatment), the patient (family) agrees to implement the following specific plan:  · Take 1 Allergra 180 mg in the morning and 1 Zyrtec at bedtime, and if no improvement should  increase to 1 Allegra 180 mg in the morning and 2 Zyrtec at bedtime  Advised to be careful of being to sleepy  What is urticaria? Urticaria is characterised by weals (hives) or angioedema (swellings, in 10%) or both (in 40%)  There are several types of urticaria  The name urticaria is derived from the common [place] stinging nettle 'Urtica dioica'  A weal (or wheal) is a superficial skin-coloured or pale skin swelling, usually surrounded by erythema (redness) that lasts anything from a few minutes to 24 hours  Usually very itchy, it may have a burning sensation  Angioedema is deeper swelling within the skin or mucous membranes and can be skin-coloured or red  It resolves within 72 hours  Angioedema may be itchy or painful but is often asymptomatic  What is acute urticaria? Acute urticaria is urticaria, with or without angioedema, that is present for less than 6 weeks  It is often gone within hours to days  Who gets acute urticaria? One in five children or adults has an episode of acute urticaria during their lifetime  It is more common in atopic individuals  It affects all races and both sexes  What are the clinical features of acute urticaria? Urticarial weals can be a few millimeters or several centimeters in diameter, colored white or red, with or without a red flare  Each weal may last a few minutes or several hours and may change shape  Weals may be round, or form rings, a map-like pattern, targetoid lesions, or giant patches  Acute urticaria can affect any site of the body and tends to be distributed widely  Angioedema is more often localised  It commonly affects the face (especially eyelids and perioral sites), hands, feet and genitalia  It may involve tongue, uvula, soft palate, larynx  Serum sickness due to blood transfusion and serum sickness-like reactions due to certain drugs cause acute urticaria leaving bruises, fever, swollen lymph glands, joint pain and swelling      What causes acute urticaria? Weals are due to release of chemical mediators from tissue mast cells and circulating basophils  These chemical mediators include histamine, platelet-activating factor and cytokines  The mediators activate sensory nerves and cause dilation of blood vessels and leakage of fluid into surrounding tissues  Bradykinin release causes angioedema  Several hypotheses have been proposed to explain urticaria  The immune, arachidonic acid and coagulation systems are involved, and genetic mutations are under investigation  Serum sickness and serum sickness-like reactions are due to immune complex deposition in affected tissues  Acute urticaria can be induced by the following factors but the cause is not always identified   Acute viral infection -- an upper respiratory infection, viral hepatitis, infectious mononucleosis, mycoplasma    Acute bacterial infection -- a dental abscess, sinusitis    Food allergy (IgE mediated) -- usually milk, egg, peanut, shellfish    Drug allergy (IgE mediated) -- often an antibiotic    Drug pseudoallergy -- aspirin, nonselective nonsteroidal anti-inflammatory drugs, opiates, radiocontrast media; these cause urticaria without immune activation    Vaccination    Bee or wasp stings     Widespread reaction following localized contact urticaria -- rubber latex  Severe allergic urticaria may lead to anaphylactic shock (bronchospasm, collapse)  A single episode or recurrent episodes of angioedema without urticaria can be due to an angiotensin-converting enzyme (ACE) inhibitor drug  How is acute urticaria diagnosed? Acute urticaria is diagnosed in people with a short history of weals that last less than 24 hours, with or without angioedema  A thorough physical examination should be undertaken to look for underlying causes      Skin prick tests and radioallergosorbent tests (RAST) or CAP fluoroimmunoassay may be requested if a drug or food allergy is suspected in acute urticaria  Biopsy of urticaria can be non-specific and difficult to interpret  The pathology shows oedema in the dermis and dilated blood vessels, with a variable mixed inflammatory infiltrate  Vessel-wall damage indicates urticarial vasculitis  What is the treatment for acute urticaria? The main treatment for acute urticaria in adults and in children is with an oral second-generation antihistamine chosen from the list below  If the standard dose (eg 10 mg for cetirizine) is not effective, the dose can be increased fourfold (eg 40 mg cetirizine daily)  They are best taken continuously rather than on demand  They are stopped when the acute urticaria has settled down  There is not thought to be any benefit from adding a second antihistamine   Cetirizine    Loratadine    Fexofenadine    Desloratadine    Levocetirizine    Rupatadine    Bilastine  Terfenadine and astemizole should not be used as they are cardiotoxic in combination with ketoconazole or erythromycin  They are no longer available in [place]  Although systemic treatment is best avoided during pregnancy and breastfeeding, there have been no reports that second-generation antihistamines cause birth defects  If treatment is required, loratadine and cetirizine are currently preferred  Conventional first-generation antihistamines such as promethazine or chlorpheniramine are no longer recommended for urticaria  Avoidance of trigger factors  In addition to antihistamines, the cause of urticaria should be eliminated if known (eg drug or food allergy)  Avoidance of relevant type 1 (IgE-mediated) allergens clears urticaria within 48 hours  In addition to antihistamines, the triggers for urticaria should be avoided where possible  For example:   Avoid aspirin, opiates and nonsteroidal anti-inflammatory drugs (paracetamol is generally safe)      Avoid known allergies that have been confirmed by positive specific IgE/skin prick tests if these have clinical relevance for urticaria   Cool the affected area with a fan, cold flannel, ice pack or soothing moisturising lotion     Treatment of refractory acute urticaria  If non-sedating antihistamines are not effective, a 4 to 5-day course of oral prednisone (prednisolone) may be warranted in severe acute urticaria, particularly if there is angioedema  Systemic steroids do not speed up the resolution of symptoms  Intramuscular injection of adrenaline (epinephrine) is reserved for life-threatening anaphylaxis or swelling of the throat  PROCEDURE NOTE:  PUNCH BIOPSY      Performing Physician: Dr Michele Deluna    Anatomic Location; Clinical Description with size (cm); Pre-Op Diagnosis:    Specimen A  Left abdomen; skin; punch biopsy; well demarcated erythematous plaques        Anesthesia: 1% xylocaine with epi       Topical anesthesia: None       Indications: To indicate diagnosis and management plan  Procedure Details     Patient informed of the risks (including bleeding,scaring and infection) and benefits of the procedure explained  Verbal and written informed consent obtained  The area was prepped and draped in the usual fashion  Anesthesia was obtained with 1% lidocaine with epinephrine  The skin was then stretched perpendicular to the skin tension lines and a punch biopsy to an appropriate sampling depth was obtained with a 3 mm punch with a forceps and iris scissors  Hemostasis was obtained with 4-0 Ethilon x 1 suture  Complications:  None      Specimen has been sent for review by Dermatopathology  Plan:  1  Instructed to keep the wound dry and covered for 24-48h and clean thereafter  2  Warning signs of infection were reviewed  3  Recommended that the patient use acetaminophen as needed for pain  4   Sutures if any should be removed in 10 days      Standard post-procedure care has been explained and has been included in written form within the patient's copy of Informed Consent

## 2021-09-13 ENCOUNTER — OFFICE VISIT (OUTPATIENT)
Dept: DERMATOLOGY | Facility: CLINIC | Age: 41
End: 2021-09-13

## 2021-09-13 ENCOUNTER — TELEPHONE (OUTPATIENT)
Dept: DERMATOLOGY | Facility: CLINIC | Age: 41
End: 2021-09-13

## 2021-09-13 DIAGNOSIS — Z48.02 ENCOUNTER FOR REMOVAL OF SUTURES: Primary | ICD-10-CM

## 2021-09-13 PROCEDURE — RECHECK: Performed by: DERMATOLOGY

## 2021-09-13 NOTE — PROGRESS NOTES
Suture removal    Date/Time: 9/13/2021 9:49 AM  Performed by: Shira Buckley RN  Authorized by: Apurva Guadalupe MD   Universal Protocol:  Consent: Verbal consent obtained  Written consent not obtained  Risks and benefits: risks, benefits and alternatives were discussed  Consent given by: patient  Patient understanding: patient states understanding of the procedure being performed  Patient consent: the patient's understanding of the procedure matches consent given  Procedure consent: procedure consent matches procedure scheduled  Relevant documents: relevant documents present and verified  Test results: test results not available  Site marked: the operative site was not marked  Radiology Images displayed and confirmed  If images not available, report reviewed: imaging studies not available  Patient identity confirmed: verbally with patient        Patient location:  Clinic  Location:     Laterality:  Left    Location:  Trunk    Trunk location: Left abdomen  Procedure details: Tools used:  Suture removal kit    Wound appearance:  No sign(s) of infection    Number of sutures removed:  1  Post-procedure details:     Post-removal:  Band-Aid applied (Vaseline applied)    Patient tolerance of procedure: Tolerated well, no immediate complications  Comments:      Advised to continue applying Vaseline and keep covered with band-aid daily until healed  Message sent to Dr Rajiv Martínez to call patient with results of biopsy

## 2021-09-13 NOTE — RESULT ENCOUNTER NOTE
DERMATOPATHOLOGY RESULT NOTE    Results reviewed by ordering physician  Called patient to personally discuss results  Discussed results with patient  Excess numbers eosionophils noted on biopsy  Not straightforward urticaria in character  Will refer to allergy      Instructions for Clinical Derm Team:   (remember to route Result Note to appropriate staff):    None    Result & Plan by Specimen:    Specimen A: benign  Plan: referral to allergy    Final Diagnosis  A  Skin, left abdomen, punch biopsy:  Eosinophil-rich superficial to deep perivascular and interstitial dermatitis   See microscopic description and note

## 2021-11-09 ENCOUNTER — TELEMEDICINE (OUTPATIENT)
Dept: FAMILY MEDICINE CLINIC | Facility: CLINIC | Age: 41
End: 2021-11-09
Payer: COMMERCIAL

## 2021-11-09 DIAGNOSIS — Z20.822 EXPOSURE TO COVID-19 VIRUS: Primary | ICD-10-CM

## 2021-11-09 PROCEDURE — 99213 OFFICE O/P EST LOW 20 MIN: CPT | Performed by: FAMILY MEDICINE

## 2021-11-09 PROCEDURE — 3725F SCREEN DEPRESSION PERFORMED: CPT | Performed by: FAMILY MEDICINE

## 2021-11-09 PROCEDURE — 1036F TOBACCO NON-USER: CPT | Performed by: FAMILY MEDICINE

## 2021-11-09 PROCEDURE — 0241U HB NFCT DS VIR RESP RNA 4 TRGT: CPT | Performed by: FAMILY MEDICINE

## 2021-11-30 PROBLEM — J30.1 CHRONIC SEASONAL ALLERGIC RHINITIS DUE TO POLLEN: Status: ACTIVE | Noted: 2021-11-30

## 2021-11-30 PROBLEM — L50.1 CHRONIC IDIOPATHIC URTICARIA: Status: ACTIVE | Noted: 2021-11-30

## 2021-11-30 PROBLEM — J30.89 ALLERGIC RHINITIS DUE TO HOUSE DUST MITE: Status: ACTIVE | Noted: 2021-11-30

## 2022-01-06 ENCOUNTER — APPOINTMENT (OUTPATIENT)
Dept: RADIOLOGY | Facility: CLINIC | Age: 42
End: 2022-01-06
Payer: COMMERCIAL

## 2022-01-06 DIAGNOSIS — J98.01 COUGH DUE TO BRONCHOSPASM: ICD-10-CM

## 2022-01-06 PROCEDURE — 71046 X-RAY EXAM CHEST 2 VIEWS: CPT

## 2022-01-07 ENCOUNTER — IMMUNIZATIONS (OUTPATIENT)
Dept: FAMILY MEDICINE CLINIC | Facility: HOSPITAL | Age: 42
End: 2022-01-07

## 2022-01-07 DIAGNOSIS — Z23 ENCOUNTER FOR IMMUNIZATION: Primary | ICD-10-CM

## 2022-01-07 PROCEDURE — 0064A COVID-19 MODERNA VACC 0.25 ML BOOSTER: CPT

## 2022-01-07 PROCEDURE — 91306 COVID-19 MODERNA VACC 0.25 ML BOOSTER: CPT

## 2022-01-10 ENCOUNTER — TELEPHONE (OUTPATIENT)
Dept: HEMATOLOGY ONCOLOGY | Facility: CLINIC | Age: 42
End: 2022-01-10

## 2022-01-10 NOTE — TELEPHONE ENCOUNTER
New Patient Intake Form   Patient Details:    Nichole Wilburn  1980  [id number]    Appointment Information   Who is calling to schedule? Patient    If not self, what is the caller's name? Please put name of RBC nurse as well  Referring provider Mitesh Goodrich    What is the diagnosis? Monoclonal gammopathy   Is there a confirmed tissue diagnosis? no   Is patient aware of diagnosis? Yes   Have you had any imaging or labs done? If yes, where? (If imaging done outside of [facility], please remind patient to bring a disk ) Yes   Have you been seen by another Oncologist/Hematologist?  If so, who and where? no   Are the records in [facility] or Care Everywhere? yes   Are records needed from an outside facility? no   If yes, Name of facility, city and state where facility is located  N/a    Preferred [place]   Is the patient willing to be seen by another provider?   (This is for breast patients only)    Miscellaneous Information:

## 2022-01-24 ENCOUNTER — DOCUMENTATION (OUTPATIENT)
Dept: HEMATOLOGY ONCOLOGY | Facility: MEDICAL CENTER | Age: 42
End: 2022-01-24

## 2022-01-24 ENCOUNTER — TELEPHONE (OUTPATIENT)
Dept: HEMATOLOGY ONCOLOGY | Facility: CLINIC | Age: 42
End: 2022-01-24

## 2022-01-24 NOTE — TELEPHONE ENCOUNTER
Appointment Confirmation     Appointment with  Dr Scott Gaona   Appointment date & time  1/25/22 9:40am   Location [place]   Patient verbilized Understanding  yes

## 2022-01-25 ENCOUNTER — CONSULT (OUTPATIENT)
Dept: HEMATOLOGY ONCOLOGY | Facility: MEDICAL CENTER | Age: 42
End: 2022-01-25
Payer: COMMERCIAL

## 2022-01-25 ENCOUNTER — TELEPHONE (OUTPATIENT)
Dept: HEMATOLOGY ONCOLOGY | Facility: CLINIC | Age: 42
End: 2022-01-25

## 2022-01-25 VITALS
BODY MASS INDEX: 35.7 KG/M2 | RESPIRATION RATE: 16 BRPM | WEIGHT: 255 LBS | OXYGEN SATURATION: 96 % | HEART RATE: 76 BPM | HEIGHT: 71 IN | TEMPERATURE: 98 F | SYSTOLIC BLOOD PRESSURE: 126 MMHG | DIASTOLIC BLOOD PRESSURE: 70 MMHG

## 2022-01-25 DIAGNOSIS — D47.2 MONOCLONAL GAMMOPATHY: ICD-10-CM

## 2022-01-25 DIAGNOSIS — R89.9 ABNORMAL LABORATORY TEST: ICD-10-CM

## 2022-01-25 DIAGNOSIS — J30.89 ALLERGIC RHINITIS DUE TO HOUSE DUST MITE: Primary | ICD-10-CM

## 2022-01-25 PROCEDURE — 1036F TOBACCO NON-USER: CPT | Performed by: INTERNAL MEDICINE

## 2022-01-25 PROCEDURE — 3008F BODY MASS INDEX DOCD: CPT | Performed by: INTERNAL MEDICINE

## 2022-01-25 PROCEDURE — 99204 OFFICE O/P NEW MOD 45 MIN: CPT | Performed by: INTERNAL MEDICINE

## 2022-01-25 RX ORDER — SENNOSIDES 8.6 MG
650 CAPSULE ORAL EVERY 8 HOURS PRN
COMMUNITY

## 2022-01-25 NOTE — PROGRESS NOTES
Abbie Grewal  1980  [facility] [address] [facility] [address]  HEMATOLOGY/ONCOLOGY CONSULTATION REPORT    DISCUSSION/SUMMARY:    02/10/2022 11:00 a m  addendum laboratory tests came back - all normal   No additional hematology workup or follow-up is needed at this time  Patient aware  29-year-old male recently worked up for hives  The hives seemed to have been caused by the Aleve  Once Officer Ray discontinued the Aleve, the hives disappeared  Part of the workup outside [facility] included either an SPEP or immunofixation - results reportedly abnormal   Patient feels well presently and clinically there are no concerning hematology findings  Anything is possible but unlikely for 46year-old to have a clinically active monoclonal gammopathy  We discussed options  To be sure, Officer Rhina Whyte is to go for blood work now  Results will determine further workup/treatment  At this time, a hematology follow-up appointment has not been given  The laboratory test results will be reviewed and patient will be called  MGUS, unknown or uncertain may be substituted for undetermined), is a condition in which an abnormal immunoglobin protein (known as a paraprotein is found in the blood during standard laboratory blood tests  MGUS resembles multiple myeloma and similar diseases, but the levels of antibody are lower, the number of plasma cells (white blood cells that secrete antibodies) in the bone marrow is lower, and it has no symptoms or major problems  However, multiple myeloma develops at the rate of about 1 5% a year, so doctors recommend monitoring it yearly  People with monoclonal gammopathy generally do not experience signs or symptoms  Some people may experience a rash or nerve problems, such as numbness or tingling  Severe renal disease has also been found in a subset of those with monoclonal gammopathy   MGUS is usually detected by chance when the patient has a blood test for another condition or as part of standard screening  At the [facility], MGUS transformed into multiple myeloma or similar lymphoproliferative disorder at the rate of about 1-2% a year, or 17%, 34%, and 39% at 10, 20, and 25 years, respectively, of follow-up--among surviving patients  However, because they were elderly, most patients with MGUS  of something else and did not go on to develop multiple myeloma  When this was taken into account, only 11 2% developed lymphoproliferative disorders  Thong Gamez studied the prevalence of myeloma in the population as a whole (not clinic patients) in [place], [place]  They found that the prevalence of MGUS was 3 2% in people above 50, with a slight male predominance (4 0% vs  2 7%)  Prevalence increased with age: of people over 79 up to 5 3% had MGUS, while in the over-85 age group the prevalence was 7 5%  In the majority of cases (63 5%), the paraprotein level was <1 g/dl, while only a very small group had levels over 2 g/dl   A study of monoclonal protein levels conducted [facility] showed a prevalence of MGUS of approximately 5 9% in  men over the age of 48  In 2009, prospective data demonstrated that all or almost all cases of multiple myeloma are preceded by MGUS   In addition to multiple myeloma, MGUS may also progress to Waldenström's macroglobulinemia, primary amyloidosis, B-cell lymphoma, or chronic lymphocytic leukemia        Officer Bassem Sharp knows to call the hematology/oncology office if there are any other questions or concerns  Carefully review your medication list and verify that the list is accurate and up-to-date  Please call the hematology/oncology office if there are medications missing from the list, medications on the list that you are not currently taking or if there is a dosage or instruction that is different from how you're taking that medication      Patient goals and areas of care:  Recheck blood work  Barriers to care:  None  Patient is able to self-care   ______________________________________________________________________________________    Chief Complaint   Patient presents with    Consult     Monoclonal gammopathy     History of Present Illness:  57-year-old male recently developing hives  Workup included (either SPEP or immunofixation) demonstrating concern for monoclonal gammopathy  Patient was referred to Hematology  Presently Officer Brooks Waddell states feeling well  Patient had back pain last fall and started taking Aleve  It seems that the Aleve was the cause for the hives  Since being off the Aleve, the hives have gone away  Appetite is good, weight is stable  Patient is working full-time  No fevers or signs of infection  No GI or  issues  No respiratory issues  No other pain control issues  Patient states that routine health maintenance and medical care is up-to-date; patient has received the COVID vaccine and booster  Review of Systems   Constitutional: Negative  HENT: Negative  Eyes: Negative  Respiratory: Negative  Cardiovascular: Negative  Gastrointestinal: Negative  Endocrine: Negative  Genitourinary: Negative  Musculoskeletal: Negative  Skin: Negative  Allergic/Immunologic: Negative  Neurological: Negative  Hematological: Negative  Psychiatric/Behavioral: Negative  All other systems reviewed and are negative      Patient Active Problem List   Diagnosis    Dermatitis    Chronic idiopathic urticaria    Allergic rhinitis due to house dust mite    Chronic seasonal allergic rhinitis due to pollen     Past Medical History:   Diagnosis Date    Hyperlipidemia      Past Surgical History:   Procedure Laterality Date    WISDOM TOOTH EXTRACTION      all 4 removed   Past surgical history:  No prior blood transfusions    Family History   Problem Relation Age of Onset    No Known Problems Mother    Viviane Jarvis Hyperlipidemia Father     No Known Problems Brother     Prostate cancer Maternal Grandfather     Colon cancer Maternal Grandfather     Alzheimer's disease Paternal Grandmother     Alcohol abuse Paternal Grandfather          in early 63's   Meghan Bennett No Known Problems Daughter     Mental illness Neg Hx    Family history:  No known familial or genetic diseases    Social History     Socioeconomic History    Marital status: /Civil Union     Spouse name: Magi    Number of children: 1    Years of education: Not on file    Highest education level: Not on file   Occupational History    Not on file   Tobacco Use    Smoking status: Never Smoker    Smokeless tobacco: Current User     Types: Chew    Tobacco comment: couple times a month  Started   Vaping Use    Vaping Use: Never used   Substance and Sexual Activity    Alcohol use: Yes     Comment: 6 beverages/week - beer, sometimes whiskey    Drug use: Never    Sexual activity: Not on file   Other Topics Concern    Not on file   Social History Narrative    Who lives in your home: wife and daughter    What type of home do you live in: Single house    Age of your home: 38 yrs old    How long have you been living there: 3 years    Type of heat: Forced hot air    Type of fuel: Gas    What type of adalgisa is in your bedroom: Carpet    Do you have the following in or near your home:    Air products: Central air and Humidifiers in bedrooms     Pests: None    Pets:1Dog    Are pets allowed in bedroom: Yes    Open fields, wooded areas nearby: Open fields    Basement: Mostly Dry - couple spots 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, and Finished    Exposure to second hand smoke: No        Habits:    Caffeine: Current;  Amount: 2 cups/day coffee, #years 15    Chocolate: approx once a week    Other:     Social Determinants of Health     Financial Resource Strain: Not on file   Food Insecurity: Not on file   Transportation Needs: Not on file   Physical Activity: Not on file   Stress: Not on file   Social Connections: Not on file   Intimate Partner Violence: Not on file   Housing Stability: Not on file   Social history:  No tobacco, alcohol or drug abuse, no toxic exposure    Current Outpatient Medications:     acetaminophen (Tylenol 8 Hour) 650 mg CR tablet, Take 650 mg by mouth every 8 (eight) hours as needed for mild pain, Disp: , Rfl:     Cholecalciferol (Vitamin D-3) 125 MCG (5000 UT) TABS, Take 1 tablet by mouth daily OVER THE COUNTER, Disp: 30 tablet, Rfl: 11    fexofenadine (ALLEGRA) 180 MG tablet, Take 180 mg by mouth daily, Disp: , Rfl:     loratadine (CLARITIN) 10 mg tablet, Take 10 mg by mouth daily, Disp: , Rfl:     Multiple Vitamins-Minerals (MENS MULTIVITAMIN PO), Take by mouth, Disp: , Rfl:     tiotropium (SPIRIVA RESPIMAT) 1 25 MCG/ACT AERS inhaler, Inhale 2 puffs daily, Disp: 4 g, Rfl: 11    Allergies   Allergen Reactions    Naproxen Hives    Dust Mite Extract Allergic Rhinitis    Mixed Grasses Allergic Rhinitis     Vitals:    01/25/22 0957   BP: 126/70   Pulse: 76   Resp: 16   Temp: 98 °F (36 7 °C)   SpO2: 96%     Physical Exam  Constitutional:       Appearance: He is well-developed  Comments: Well-nourished male, no respiratory distress, no signs of pain   HENT:      Head: Normocephalic and atraumatic  Right Ear: External ear normal       Left Ear: External ear normal    Eyes:      Conjunctiva/sclera: Conjunctivae normal       Pupils: Pupils are equal, round, and reactive to light  Cardiovascular:      Rate and Rhythm: Normal rate and regular rhythm  Heart sounds: Normal heart sounds  Pulmonary:      Effort: Pulmonary effort is normal       Breath sounds: Normal breath sounds  Comments: Good air entry bilaterally, clear  Abdominal:      General: Bowel sounds are normal       Palpations: Abdomen is soft        Comments: +bowel sounds, nontender, no past splenomegaly, no guarding   Musculoskeletal:         General: Normal range of motion  Cervical back: Normal range of motion and neck supple  Skin:     General: Skin is warm  Comments: Warm, moist, good color, no petechiae or ecchymoses   Neurological:      Mental Status: He is alert and oriented to person, place, and time  Deep Tendon Reflexes: Reflexes are normal and symmetric  Psychiatric:         Behavior: Behavior normal          Thought Content: Thought content normal          Judgment: Judgment normal      Extremities:  No lower extremity edema bilaterally, no cords, pulses are 1+  Lymphatics:  No adenopathy in the neck, supraclavicular region, axilla bilaterally    Labs    08/30/2021 BUN = 13 creatinine = 0 87 calcium = 9 0 AST = 19 ALT = 45 alkaline phosphatase = 71 total protein = 7 8 albumin = 4 1 total bilirubin = 0 49    Imaging    01/06/2022 chest x-ray PA and lateral   Impression stated no acute cardiopulmonary disease  Pathology    Case Report   Surgical Pathology Report                         Case: [id number]                                    Authorizing Provider: Rona Lew MD          Collected:           09/03/2021 [facility]               Ordering Location:     [facility]      Received:            09/03/2021 [address] [place] [address]                                                                        Pathologist:           Filomena Barthel, MD                                                            Specimen:    Skin, Other, Specimen A  Left abdomen                                                      Final Diagnosis   A  Skin, left abdomen, punch biopsy:  Eosinophil-rich superficial to deep perivascular and interstitial dermatitis  See microscopic description and note '     Note:  The histopathologic findings are most suggestive of a dermal hypersensitivity reaction as seen in arthropod bites and other infestations, and also in medication related eruptions   Similar changes can sometimes be seen in urticarial reactions, however, the prominent number of eosinophils are not typical  Changes of vasculitis are not identified  While eruptions rich in dermal eosinophils can be seen in eosinophilic cellulitis in the appropriate clinical setting, the number of eosinophils noted herein are not sufficiently high as in typical cases, and the absence of flame figures is not supportive of this diagnosis  Correlation with clinical findings and peripheral eosinophil counts is advised  The clinical photos and notes were reviewed     Electronically signed by Carine Yuan MD on 9/12/2021 at  4:47 PM

## 2022-01-25 NOTE — TELEPHONE ENCOUNTER
Patient called requesting lab slip be faxed to [facility] at [phone]   Lab orders have been faxed

## 2022-01-26 LAB
ALBUMIN SERPL-MCNC: 4.6 G/DL (ref 4–5)
ALBUMIN/GLOB SERPL: 1.6 {RATIO} (ref 1.2–2.2)
ALP SERPL-CCNC: 69 IU/L (ref 44–121)
ALT SERPL-CCNC: 34 IU/L (ref 0–44)
AST SERPL-CCNC: 23 IU/L (ref 0–40)
B2 MICROGLOB SERPL-MCNC: 1.4 MG/L (ref 0.6–2.4)
BASOPHILS # BLD AUTO: 0.1 X10E3/UL (ref 0–0.2)
BASOPHILS NFR BLD AUTO: 1 %
BILIRUB SERPL-MCNC: 0.3 MG/DL (ref 0–1.2)
BUN SERPL-MCNC: 19 MG/DL (ref 6–20)
BUN/CREAT SERPL: 20 (ref 9–20)
CALCIUM SERPL-MCNC: 9.7 MG/DL (ref 8.7–10.2)
CHLORIDE SERPL-SCNC: 106 MMOL/L (ref 96–106)
CO2 SERPL-SCNC: 22 MMOL/L (ref 20–29)
CREAT SERPL-MCNC: 0.95 MG/DL (ref 0.76–1.27)
EOSINOPHIL # BLD AUTO: 0.1 X10E3/UL (ref 0–0.4)
EOSINOPHIL NFR BLD AUTO: 2 %
ERYTHROCYTE [DISTWIDTH] IN BLOOD BY AUTOMATED COUNT: 13.3 % (ref 11.6–15.4)
GLOBULIN SER-MCNC: 2.8 G/DL (ref 1.5–4.5)
GLUCOSE SERPL-MCNC: 89 MG/DL (ref 65–99)
HCT VFR BLD AUTO: 42.7 % (ref 37.5–51)
HGB BLD-MCNC: 15.1 G/DL (ref 13–17.7)
IGA SERPL-MCNC: 349 MG/DL (ref 90–386)
IGG SERPL-MCNC: 1387 MG/DL (ref 603–1613)
IGM SERPL-MCNC: 84 MG/DL (ref 20–172)
IMM GRANULOCYTES # BLD: 0 X10E3/UL (ref 0–0.1)
IMM GRANULOCYTES NFR BLD: 0 %
KAPPA LC FREE SER-MCNC: 18.1 MG/L (ref 3.3–19.4)
KAPPA LC FREE/LAMBDA FREE SER: 1.29 {RATIO} (ref 0.26–1.65)
LAMBDA LC FREE SERPL-MCNC: 14 MG/L (ref 5.7–26.3)
LDH SERPL-CCNC: 167 IU/L (ref 121–224)
LYMPHOCYTES # BLD AUTO: 2.9 X10E3/UL (ref 0.7–3.1)
LYMPHOCYTES NFR BLD AUTO: 55 %
MCH RBC QN AUTO: 30.5 PG (ref 26.6–33)
MCHC RBC AUTO-ENTMCNC: 35.4 G/DL (ref 31.5–35.7)
MCV RBC AUTO: 86 FL (ref 79–97)
MONOCYTES # BLD AUTO: 0.4 X10E3/UL (ref 0.1–0.9)
MONOCYTES NFR BLD AUTO: 8 %
NEUTROPHILS # BLD AUTO: 1.8 X10E3/UL (ref 1.4–7)
NEUTROPHILS NFR BLD AUTO: 34 %
PLATELET # BLD AUTO: 244 X10E3/UL (ref 150–450)
POTASSIUM SERPL-SCNC: 4.6 MMOL/L (ref 3.5–5.2)
PROT PATTERN SERPL IFE-IMP: NORMAL
PROT SERPL-MCNC: 7.4 G/DL (ref 6–8.5)
RBC # BLD AUTO: 4.95 X10E6/UL (ref 4.14–5.8)
SL AMB EGFR AFRICAN AMERICAN: 116 ML/MIN/1.73
SL AMB EGFR NON AFRICAN AMERICAN: 100 ML/MIN/1.73
SODIUM SERPL-SCNC: 141 MMOL/L (ref 134–144)
URATE SERPL-MCNC: 7.4 MG/DL (ref 3.8–8.4)
WBC # BLD AUTO: 5.4 X10E3/UL (ref 3.4–10.8)

## 2022-01-28 LAB — IGE SERPL-ACNC: 63 IU/ML (ref 6–495)

## 2022-04-22 ENCOUNTER — RA CDI HCC (OUTPATIENT)
Dept: OTHER | Facility: HOSPITAL | Age: 42
End: 2022-04-22

## 2022-04-22 NOTE — PROGRESS NOTES
[facility]  coding opportunities       Chart reviewed, no opportunity found: CHART REVIEWED, NO OPPORTUNITY FOUND        Patients Insurance        Commercial Insurance: Amy Sutton

## 2022-04-25 ENCOUNTER — OFFICE VISIT (OUTPATIENT)
Dept: FAMILY MEDICINE CLINIC | Facility: CLINIC | Age: 42
End: 2022-04-25
Payer: COMMERCIAL

## 2022-04-25 VITALS
WEIGHT: 250 LBS | SYSTOLIC BLOOD PRESSURE: 128 MMHG | DIASTOLIC BLOOD PRESSURE: 76 MMHG | RESPIRATION RATE: 16 BRPM | HEART RATE: 64 BPM | HEIGHT: 71 IN | BODY MASS INDEX: 35 KG/M2 | TEMPERATURE: 98.4 F

## 2022-04-25 DIAGNOSIS — M25.561 ACUTE PAIN OF RIGHT KNEE: Primary | ICD-10-CM

## 2022-04-25 PROCEDURE — 1036F TOBACCO NON-USER: CPT | Performed by: FAMILY MEDICINE

## 2022-04-25 PROCEDURE — 3725F SCREEN DEPRESSION PERFORMED: CPT | Performed by: FAMILY MEDICINE

## 2022-04-25 PROCEDURE — 99213 OFFICE O/P EST LOW 20 MIN: CPT | Performed by: FAMILY MEDICINE

## 2022-04-25 PROCEDURE — 3008F BODY MASS INDEX DOCD: CPT | Performed by: FAMILY MEDICINE

## 2022-04-25 RX ORDER — PREDNISONE 20 MG/1
TABLET ORAL
Qty: 32 TABLET | Refills: 0 | Status: SHIPPED | OUTPATIENT
Start: 2022-04-25

## 2022-04-25 NOTE — PROGRESS NOTES
Assessment/Plan:    1  Acute pain of right knee  -     predniSONE 20 mg tablet; 4 tabs for three days, 3 tabs for three days, 2 tabs for three days, 1 tab for three days, 1/2 tab for 4 days  -     Ambulatory Referral to Physical Therapy; Future          There are no Patient Instructions on file for this visit  No follow-ups on file  Subjective:      Patient ID: Julisa Crwaford is a 46 y o  male  Chief Complaint   Patient presents with    Knee Pain     right knee, happened a little over a week ago  ac/lpn       Pt is scheduled of rt knee pain  PT states the knee is unstable  States it seems to be weak  States he started wearing a knee brace  States last time he hurt his knee he got a knee brace, MRI and an injection    Pt states this has masha a little over a week  States he does not really recall doing anything  Pain is not terrible at 2/30  The following portions of the patient's history were reviewed and updated as appropriate: allergies, current medications, past family history, past medical history, past social history, past surgical history and problem list     Review of Systems   Musculoskeletal: Positive for arthralgias           Current Outpatient Medications   Medication Sig Dispense Refill    acetaminophen (Tylenol 8 Hour) 650 mg CR tablet Take 650 mg by mouth every 8 (eight) hours as needed for mild pain      Cholecalciferol (Vitamin D-3) 125 MCG (5000 UT) TABS Take 1 tablet by mouth daily OVER THE COUNTER 30 tablet 11    fexofenadine (ALLEGRA) 180 MG tablet Take 180 mg by mouth daily      Multiple Vitamins-Minerals (MENS MULTIVITAMIN PO) Take by mouth      loratadine (CLARITIN) 10 mg tablet Take 10 mg by mouth daily (Patient not taking: Reported on 4/25/2022 )      predniSONE 20 mg tablet 4 tabs for three days, 3 tabs for three days, 2 tabs for three days, 1 tab for three days, 1/2 tab for 4 days 32 tablet 0    tiotropium (SPIRIVA RESPIMAT) 1 25 MCG/ACT AERS inhaler Inhale 2 puffs daily (Patient not taking: Reported on 4/25/2022 ) 4 g 11     No current facility-administered medications for this visit         Objective:    /76   Pulse 64   Temp 98 4 °F (36 9 °C)   Resp 16   Ht 5' 11" (1 803 m)   Wt 113 kg (250 lb)   BMI 34 87 kg/m²        Physical Exam  Musculoskeletal:      Comments: Mild creps in rt knee  Joint line tenderness medial aspect of rt knee                Cristóbal Valencia,

## 2022-10-11 ENCOUNTER — OFFICE VISIT (OUTPATIENT)
Dept: FAMILY MEDICINE CLINIC | Facility: CLINIC | Age: 42
End: 2022-10-11
Payer: COMMERCIAL

## 2022-10-11 VITALS
WEIGHT: 251.8 LBS | TEMPERATURE: 97.5 F | SYSTOLIC BLOOD PRESSURE: 124 MMHG | RESPIRATION RATE: 17 BRPM | OXYGEN SATURATION: 98 % | HEART RATE: 75 BPM | HEIGHT: 71 IN | BODY MASS INDEX: 35.25 KG/M2 | DIASTOLIC BLOOD PRESSURE: 82 MMHG

## 2022-10-11 DIAGNOSIS — Z00.00 ROUTINE ADULT HEALTH MAINTENANCE: Primary | ICD-10-CM

## 2022-10-11 DIAGNOSIS — Z13.0 SCREENING FOR DEFICIENCY ANEMIA: ICD-10-CM

## 2022-10-11 DIAGNOSIS — Z13.6 SCREENING FOR CARDIOVASCULAR CONDITION: ICD-10-CM

## 2022-10-11 DIAGNOSIS — Z13.1 SCREENING FOR DIABETES MELLITUS: ICD-10-CM

## 2022-10-11 PROCEDURE — 99395 PREV VISIT EST AGE 18-39: CPT | Performed by: NURSE PRACTITIONER

## 2022-10-11 NOTE — PATIENT INSTRUCTIONS
Wellness Visit for Adults   AMBULATORY CARE:   A wellness visit  is when you see your healthcare provider to get screened for health problems  Your healthcare provider will also give you advice on how to stay healthy  Write down your questions so you remember to ask them  Ask your healthcare provider how often you should have a wellness visit  What happens at a wellness visit:  Your healthcare provider will ask about your health, and your family history of health problems  This includes high blood pressure, heart disease, and cancer  He or she will ask if you have symptoms that concern you, if you smoke, and about your mood  You may also be asked about your intake of medicines, supplements, food, and alcohol  Any of the following may be done: Your weight  will be checked  Your height may also be checked so your body mass index (BMI) can be calculated  Your BMI shows if you are at a healthy weight  Your blood pressure  and heart rate will be checked  Your temperature may also be checked  Blood and urine tests  may be done  Blood tests may be done to check your cholesterol levels  Abnormal cholesterol levels increase your risk for heart disease and stroke  You may also need a blood or urine test to check for diabetes if you are at increased risk  Urine tests may be done to look for signs of an infection or kidney disease  A physical exam  includes checking your heartbeat and lungs with a stethoscope  Your healthcare provider may also check your skin to look for sun damage  Screening tests  may be recommended  A screening test is done to check for diseases that may not cause symptoms  The screening tests you may need depend on your age, gender, family history, and lifestyle habits  For example, colorectal screening may be recommended if you are 48years old or older  Screening tests you need if you are a woman:   A Pap smear  is used to screen for cervical cancer   Pap smears are usually done every 3 to 5 years depending on your age  You may need them more often if you have had abnormal Pap smear test results in the past  Ask your healthcare provider how often you should have a Pap smear  A mammogram  is an x-ray of your breasts to screen for breast cancer  Experts recommend mammograms every 2 years starting at age 48 years  You may need a mammogram at age 52 years or younger if you have an increased risk for breast cancer  Talk to your healthcare provider about when you should start having mammograms and how often you need them  Vaccines you may need:   Get an influenza vaccine  every year  The influenza vaccine protects you from the flu  Several types of viruses cause the flu  The viruses change over time, so new vaccines are made each year  Get a tetanus-diphtheria (Td) booster vaccine  every 10 years  This vaccine protects you against tetanus and diphtheria  Tetanus is a severe infection that may cause painful muscle spasms and lockjaw  Diphtheria is a severe bacterial infection that causes a thick covering in the back of your mouth and throat  Get a human papillomavirus (HPV) vaccine  if you are female and aged 23 to 32 or male 23 to 24 and never received it  This vaccine protects you from HPV infection  HPV is the most common infection spread by sexual contact  HPV may also cause vaginal, penile, and anal cancers  Get a pneumococcal vaccine  if you are aged 72 years or older  The pneumococcal vaccine is an injection given to protect you from pneumococcal disease  Pneumococcal disease is an infection caused by pneumococcal bacteria  The infection may cause pneumonia, meningitis, or an ear infection  Get a shingles vaccine  if you are 60 or older, even if you have had shingles before  The shingles vaccine is an injection to protect you from the varicella-zoster virus  This is the same virus that causes chickenpox   Shingles is a painful rash that develops in people who had chickenpox or have been exposed to the virus  How to eat healthy:  My Plate is a model for planning healthy meals  It shows the types and amounts of foods that should go on your plate  Fruits and vegetables make up about half of your plate, and grains and protein make up the other half  A serving of dairy is included on the side of your plate  The amount of calories and serving sizes you need depends on your age, gender, weight, and height  Examples of healthy foods are listed below:  Eat a variety of vegetables  such as dark green, red, and orange vegetables  You can also include canned vegetables low in sodium (salt) and frozen vegetables without added butter or sauces  Eat a variety of fresh fruits , canned fruit in 100% juice, frozen fruit, and dried fruit  Include whole grains  At least half of the grains you eat should be whole grains  Examples include whole-wheat bread, wheat pasta, brown rice, and whole-grain cereals such as oatmeal     Eat a variety of protein foods such as seafood (fish and shellfish), lean meat, and poultry without skin (turkey and chicken)  Examples of lean meats include pork leg, shoulder, or tenderloin, and beef round, sirloin, tenderloin, and extra lean ground beef  Other protein foods include eggs and egg substitutes, beans, peas, soy products, nuts, and seeds  Choose low-fat dairy products such as skim or 1% milk or low-fat yogurt, cheese, and cottage cheese  Limit unhealthy fats  such as butter, hard margarine, and shortening  Exercise:  Exercise at least 30 minutes per day on most days of the week  Some examples of exercise include walking, biking, dancing, and swimming  You can also fit in more physical activity by taking the stairs instead of the elevator or parking farther away from stores  Include muscle strengthening activities 2 days each week  Regular exercise provides many health benefits   It helps you manage your weight, and decreases your risk for type 2 diabetes, heart disease, stroke, and high blood pressure  Exercise can also help improve your mood  Ask your healthcare provider about the best exercise plan for you  General health and safety guidelines:   Do not smoke  Nicotine and other chemicals in cigarettes and cigars can cause lung damage  Ask your healthcare provider for information if you currently smoke and need help to quit  E-cigarettes or smokeless tobacco still contain nicotine  Talk to your healthcare provider before you use these products  Limit alcohol  A drink of alcohol is 12 ounces of beer, 5 ounces of wine, or 1½ ounces of liquor  Lose weight, if needed  Being overweight increases your risk of certain health conditions  These include heart disease, high blood pressure, type 2 diabetes, and certain types of cancer  Protect your skin  Do not sunbathe or use tanning beds  Use sunscreen with a SPF 15 or higher  Apply sunscreen at least 15 minutes before you go outside  Reapply sunscreen every 2 hours  Wear protective clothing, hats, and sunglasses when you are outside  Drive safely  Always wear your seatbelt  Make sure everyone in your car wears a seatbelt  A seatbelt can save your life if you are in an accident  Do not use your cell phone when you are driving  This could distract you and cause an accident  Pull over if you need to make a call or send a text message  Practice safe sex  Use latex condoms if are sexually active and have more than one partner  Your healthcare provider may recommend screening tests for sexually transmitted infections (STIs)  Wear helmets, lifejackets, and protective gear  Always wear a helmet when you ride a bike or motorcycle, go skiing, or play sports that could cause a head injury  Wear protective equipment when you play sports  Wear a lifejacket when you are on a boat or doing water sports      © Copyright [employer] 2022 Information is for End User's use only and may not be sold, redistributed or otherwise used for commercial purposes  All illustrations and images included in CareNotes® are the copyrighted property of A D A M , Inc  or Virginia Diaz  The above information is an  only  It is not intended as medical advice for individual conditions or treatments  Talk to your doctor, nurse or pharmacist before following any medical regimen to see if it is safe and effective for you

## 2022-10-11 NOTE — PROGRESS NOTES
FAMILY PRACTICE HEALTH MAINTENANCE OFFICE VISIT  [facility]    NAME: Roverto Downing  AGE: 46 y o  SEX: male  : 1980     DATE: 10/11/2022    Assessment and Plan     1  Routine adult health maintenance    2  Screening for diabetes mellitus  -     Comprehensive metabolic panel; Future  -     Comprehensive metabolic panel    3  Screening for cardiovascular condition  -     Lipid Panel with Direct LDL reflex; Future  -     Lipid Panel with Direct LDL reflex    4  Screening for deficiency anemia  -     CBC; Future  -     CBC      Patient Counseling:   Nutrition: Stressed importance of a well balanced diet, moderation of sodium/saturated fat, caloric balance and sufficient intake of fiber  Exercise: Stressed the importance of regular exercise with a goal of 150 minutes per week  Dental Health: Discussed daily flossing and brushing and regular dental visits   Sexuality: Discussed sexually transmitted infections, use of condoms and prevention of unintended pregnancy  Alcohol Use:  Recommended moderation of alcohol intake  Injury Prevention: Discussed Safety Belts, Safety Helmets, and Smoke Detectors    Immunizations reviewed: Declined recommended vaccinations  Discussed benefits of:  Screening labs  BMI Counseling: Body mass index is 35 12 kg/m²  Discussed with patient's BMI with him  The BMI is above normal  Nutrition recommendations include reducing portion sizes, decreasing overall calorie intake, 3-5 servings of fruits/vegetables daily, reducing fast food intake, consuming healthier snacks, decreasing soda and/or juice intake, moderation in carbohydrate intake, increasing intake of lean protein and reducing intake of saturated fat and trans fat      Return in about 1 year (around 10/11/2023) for Annual physical         Chief Complaint     Chief Complaint   Patient presents with   • Annual Exam     Nm lpn       History of Present Illness     HPI    Well Adult Physical Patient here for a comprehensive physical exam   Denies any concerns and complains  Refused hiv and hep c screening  Refused flu vaccine  Denies any family h/o colon cancer    Diet and Physical Activity  Diet: well balanced diet  Exercise: frequently      Depression Screen  PHQ-2/9 Depression Screening    Little interest or pleasure in doing things: 0 - not at all  Feeling down, depressed, or hopeless: 0 - not at all  PHQ-2 Score: 0  PHQ-2 Interpretation: Negative depression screen          General Health  Hearing: Normal:  bilateral  Vision: no vision problems and most recent eye exam >1 year  Dental: regular dental visits    Reproductive Health  Denies nocturia      The following portions of the patient's history were reviewed and updated as appropriate: allergies, current medications, past family history, past medical history, past social history, past surgical history and problem list     Review of Systems     Review of Systems   Constitutional: Negative  HENT: Negative  Eyes: Negative  Respiratory: Negative  Cardiovascular: Negative  Gastrointestinal: Negative  Endocrine: Negative  Genitourinary: Negative  Musculoskeletal: Negative  Skin: Negative  Allergic/Immunologic: Negative  Neurological: Negative  Hematological: Negative  Psychiatric/Behavioral: Negative  Past Medical History     Past Medical History:   Diagnosis Date   • Hyperlipidemia        Past Surgical History     Past Surgical History:   Procedure Laterality Date   • WISDOM TOOTH EXTRACTION      all 4 removed       Social History     Social History     Socioeconomic History   • Marital status: /Civil Union     Spouse name: Magi   • Number of children: 1   • Years of education: None   • Highest education level: None   Occupational History   • None   Tobacco Use   • Smoking status: Never Smoker   • Smokeless tobacco: Current User     Types: Chew   • Tobacco comment: couple times a month  Started   Vaping Use   • Vaping Use: Never used   Substance and Sexual Activity   • Alcohol use: Yes     Comment: 6 beverages/week - beer, sometimes whiskey   • Drug use: Never   • Sexual activity: None   Other Topics Concern   • None   Social History Narrative    Who lives in your home: wife and daughter    What type of home do you live in: Single house    Age of your home: 38 yrs old    How long have you been living there: 3 years    Type of heat: Forced hot air    Type of fuel: Gas    What type of adalgisa is in your bedroom: Carpet    Do you have the following in or near your home:    Air products: Central air and Humidifiers in bedrooms     Pests: None    Pets:1Dog    Are pets allowed in bedroom: Yes    Open fields, wooded areas nearby: Open fields    Basement: Mostly Dry - couple spots 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, and Finished    Exposure to second hand smoke: No        Habits:    Caffeine: Current;  Amount: 2 cups/day coffee, #years 15    Chocolate: approx once a week    Other:     Social Determinants of Health     Financial Resource Strain: Not on file   Food Insecurity: Not on file   Transportation Needs: Not on file   Physical Activity: Not on file   Stress: Not on file   Social Connections: Not on file   Intimate Partner Violence: Not on file   Housing Stability: Not on file       Family History     Family History   Problem Relation Age of Onset   • No Known Problems Mother    • Hyperlipidemia Father    • No Known Problems Brother    • Prostate cancer Maternal Grandfather    • Colon cancer Maternal Grandfather    • Alzheimer's disease Paternal Grandmother    • Alcohol abuse Paternal Grandfather          in early 63's   • No Known Problems Daughter    • Mental illness Neg Hx        Current Medications       Current Outpatient Medications:   •  acetaminophen (TYLENOL) 650 mg CR tablet, Take 650 mg by mouth every 8 (eight) hours as needed for mild pain, Disp: , Rfl:   • Cholecalciferol (Vitamin D-3) 125 MCG (5000 UT) TABS, Take 1 tablet by mouth daily OVER THE COUNTER, Disp: 30 tablet, Rfl: 11  •  fexofenadine (ALLEGRA) 180 MG tablet, Take 180 mg by mouth daily, Disp: , Rfl:   •  Multiple Vitamins-Minerals (MENS MULTIVITAMIN PO), Take by mouth, Disp: , Rfl:   •  loratadine (CLARITIN) 10 mg tablet, Take 10 mg by mouth daily (Patient not taking: No sig reported), Disp: , Rfl:   •  tiotropium (SPIRIVA RESPIMAT) 1 25 MCG/ACT AERS inhaler, Inhale 2 puffs daily (Patient not taking: No sig reported), Disp: 4 g, Rfl: 11     Allergies     Allergies   Allergen Reactions   • Naproxen Hives   • Dust Mite Extract Allergic Rhinitis   • Mixed Grasses Allergic Rhinitis       Objective     /82   Pulse 75   Temp 97 5 °F (36 4 °C)   Resp 17   Ht 5' 11" (1 803 m)   Wt 114 kg (251 lb 12 8 oz)   SpO2 98%   BMI 35 12 kg/m²      Physical Exam  Vitals reviewed  Constitutional:       Appearance: Normal appearance  He is well-developed  HENT:      Head: Normocephalic  Right Ear: Tympanic membrane, ear canal and external ear normal       Left Ear: Tympanic membrane, ear canal and external ear normal       Nose: Nose normal       Right Sinus: No maxillary sinus tenderness or frontal sinus tenderness  Left Sinus: No maxillary sinus tenderness or frontal sinus tenderness  Mouth/Throat:      Lips: Pink  Mouth: Mucous membranes are moist       Dentition: Normal dentition  No gingival swelling  Pharynx: No pharyngeal swelling, oropharyngeal exudate or posterior oropharyngeal erythema  Eyes:      General: Lids are normal       Conjunctiva/sclera: Conjunctivae normal       Pupils: Pupils are equal, round, and reactive to light  Neck:      Thyroid: No thyromegaly  Vascular: No carotid bruit  Cardiovascular:      Rate and Rhythm: Normal rate and regular rhythm  Pulses:           Radial pulses are 2+ on the right side and 2+ on the left side          Femoral pulses are 2+ on the right side and 2+ on the left side  Dorsalis pedis pulses are 2+ on the right side and 2+ on the left side  Posterior tibial pulses are 2+ on the right side and 2+ on the left side  Heart sounds: Normal heart sounds  No murmur heard  Pulmonary:      Effort: Pulmonary effort is normal       Breath sounds: Normal breath sounds  Chest:   Breasts:      Right: No swelling, mass, nipple discharge, skin change, tenderness, axillary adenopathy or supraclavicular adenopathy  Left: No swelling, mass, nipple discharge, skin change, tenderness, axillary adenopathy or supraclavicular adenopathy  Abdominal:      General: Abdomen is flat  Bowel sounds are normal       Palpations: Abdomen is soft  There is no hepatomegaly  Tenderness: There is no abdominal tenderness  There is no rebound  Hernia: No hernia is present  There is no hernia in the umbilical area, ventral area, left inguinal area or right inguinal area  Musculoskeletal:         General: No tenderness or deformity  Normal range of motion  Cervical back: Full passive range of motion without pain, normal range of motion and neck supple  Lymphadenopathy:      Cervical:      Right cervical: No superficial or posterior cervical adenopathy  Left cervical: No superficial or posterior cervical adenopathy  Upper Body:      Right upper body: No supraclavicular or axillary adenopathy  Left upper body: No supraclavicular or axillary adenopathy  Lower Body: No right inguinal adenopathy  No left inguinal adenopathy  Skin:     General: Skin is warm and dry  Neurological:      Mental Status: He is alert and oriented to person, place, and time  GCS: GCS eye subscore is 4  GCS verbal subscore is 5  GCS motor subscore is 6  Sensory: No sensory deficit  Coordination: Coordination normal       Gait: Gait normal       Deep Tendon Reflexes: Reflexes are normal and symmetric  Psychiatric:         Speech: Speech normal          Behavior: Behavior normal          Thought Content:  Thought content normal          Judgment: Judgment normal             Visual Acuity Screening    Right eye Left eye Both eyes   Without correction: 20/20 20/15 20/13   With correction:              [address]

## 2022-10-13 LAB
ALBUMIN SERPL-MCNC: 4.4 G/DL (ref 4–5)
ALBUMIN/GLOB SERPL: 1.6 {RATIO} (ref 1.2–2.2)
ALP SERPL-CCNC: 65 IU/L (ref 44–121)
ALT SERPL-CCNC: 24 IU/L (ref 0–44)
AST SERPL-CCNC: 20 IU/L (ref 0–40)
BILIRUB SERPL-MCNC: 0.3 MG/DL (ref 0–1.2)
BUN SERPL-MCNC: 16 MG/DL (ref 6–20)
BUN/CREAT SERPL: 18 (ref 9–20)
CALCIUM SERPL-MCNC: 9.6 MG/DL (ref 8.7–10.2)
CHLORIDE SERPL-SCNC: 102 MMOL/L (ref 96–106)
CHOLEST SERPL-MCNC: 237 MG/DL (ref 100–199)
CO2 SERPL-SCNC: 22 MMOL/L (ref 20–29)
CREAT SERPL-MCNC: 0.9 MG/DL (ref 0.76–1.27)
EGFR: 111 ML/MIN/1.73
ERYTHROCYTE [DISTWIDTH] IN BLOOD BY AUTOMATED COUNT: 12.3 % (ref 11.6–15.4)
GLOBULIN SER-MCNC: 2.7 G/DL (ref 1.5–4.5)
GLUCOSE SERPL-MCNC: 97 MG/DL (ref 70–99)
HCT VFR BLD AUTO: 40.9 % (ref 37.5–51)
HDLC SERPL-MCNC: 54 MG/DL
HGB BLD-MCNC: 14.2 G/DL (ref 13–17.7)
LDLC SERPL CALC-MCNC: 164 MG/DL (ref 0–99)
LDLC/HDLC SERPL: 3 RATIO (ref 0–3.6)
MCH RBC QN AUTO: 30.6 PG (ref 26.6–33)
MCHC RBC AUTO-ENTMCNC: 34.7 G/DL (ref 31.5–35.7)
MCV RBC AUTO: 88 FL (ref 79–97)
MICRODELETION SYND BLD/T FISH: NORMAL
PLATELET # BLD AUTO: 252 X10E3/UL (ref 150–450)
POTASSIUM SERPL-SCNC: 4.2 MMOL/L (ref 3.5–5.2)
PROT SERPL-MCNC: 7.1 G/DL (ref 6–8.5)
RBC # BLD AUTO: 4.64 X10E6/UL (ref 4.14–5.8)
SL AMB VLDL CHOLESTEROL CALC: 19 MG/DL (ref 5–40)
SODIUM SERPL-SCNC: 139 MMOL/L (ref 134–144)
TRIGL SERPL-MCNC: 108 MG/DL (ref 0–149)
WBC # BLD AUTO: 5.8 X10E3/UL (ref 3.4–10.8)

## 2023-08-22 ENCOUNTER — OFFICE VISIT (OUTPATIENT)
Dept: FAMILY MEDICINE CLINIC | Facility: CLINIC | Age: 43
End: 2023-08-22
Payer: COMMERCIAL

## 2023-08-22 VITALS
SYSTOLIC BLOOD PRESSURE: 122 MMHG | WEIGHT: 258.38 LBS | DIASTOLIC BLOOD PRESSURE: 80 MMHG | BODY MASS INDEX: 36.99 KG/M2 | RESPIRATION RATE: 18 BRPM | HEART RATE: 74 BPM | TEMPERATURE: 97.9 F | HEIGHT: 70 IN

## 2023-08-22 DIAGNOSIS — Z00.00 WELL ADULT EXAM: Primary | ICD-10-CM

## 2023-08-22 DIAGNOSIS — Z13.29 SCREENING FOR THYROID DISORDER: ICD-10-CM

## 2023-08-22 DIAGNOSIS — Z13.83 SCREENING FOR CARDIOVASCULAR, RESPIRATORY, AND GENITOURINARY DISEASES: ICD-10-CM

## 2023-08-22 DIAGNOSIS — Z13.89 SCREENING FOR CARDIOVASCULAR, RESPIRATORY, AND GENITOURINARY DISEASES: ICD-10-CM

## 2023-08-22 DIAGNOSIS — Z11.59 NEED FOR HEPATITIS C SCREENING TEST: ICD-10-CM

## 2023-08-22 DIAGNOSIS — Z13.6 SCREENING FOR CARDIOVASCULAR, RESPIRATORY, AND GENITOURINARY DISEASES: ICD-10-CM

## 2023-08-22 PROCEDURE — 99396 PREV VISIT EST AGE 40-64: CPT | Performed by: FAMILY MEDICINE

## 2023-08-22 NOTE — PROGRESS NOTES
[facility]AL PHYS[facility]    NAME: Marielle Alexander  AGE: 38 y.o. SEX: male  : 1980     DATE: 2023     Assessment and Plan:     Problem List Items Addressed This Visit    None  Visit Diagnoses     Well adult exam    -  Primary    Need for hepatitis C screening test        Relevant Orders    Hepatitis C Antibody    Screening for cardiovascular, respiratory, and genitourinary diseases        Relevant Orders    CBC and differential    Comprehensive metabolic panel    Lipid Panel with Direct LDL reflex    Screening for thyroid disorder        Relevant Orders    TSH, 3rd generation with Free T4 reflex          Immunizations and preventive care screenings were discussed with patient today. Appropriate education was printed on patient's after visit summary. BMI Counseling: Body mass index is 37.07 kg/m². The BMI is above normal. Nutrition recommendations include decreasing portion sizes, encouraging healthy choices of fruits and vegetables, decreasing fast food intake, consuming healthier snacks, limiting drinks that contain sugar and moderation in carbohydrate intake. Exercise recommendations include moderate physical activity 150 minutes/week. Rationale for BMI follow-up plan is due to patient being overweight or obese. Depression Screening and Follow-up Plan: Patient was screened for depression during today's encounter. They screened negative with a PHQ-2 score of 0. No follow-ups on file. Chief Complaint:     Chief Complaint   Patient presents with   • Annual Exam     Lw cma      History of Present Illness:     Adult Annual Physical   Patient here for a comprehensive physical exam. The patient reports no problems. Diet and Physical Activity  Diet/Nutrition: well balanced diet. Exercise: 1-2 times a week on average.       Depression Screening  PHQ-2/9 Depression Screening    Little interest or pleasure in doing things: 0 - not at all  Feeling down, depressed, or hopeless: 0 - not at all  PHQ-2 Score: 0  PHQ-2 Interpretation: Negative depression screen       General Health  Sleep: gets 7-8 hours of sleep on average and unrefreshing sleep. Hearing: normal - bilateral.  Vision: no vision problems. Dental: regular dental visits, brushes teeth twice daily and flosses teeth occasionally.  Health  Symptoms include: none     Review of Systems:     Review of Systems   Constitutional: Negative. HENT: Negative. Eyes: Negative. Respiratory: Negative. Cardiovascular: Negative. Gastrointestinal: Negative. Endocrine: Negative. Genitourinary: Negative. Musculoskeletal: Negative. Neurological: Negative. Hematological: Negative. Psychiatric/Behavioral: Negative. Past Medical History:     Past Medical History:   Diagnosis Date   • Hyperlipidemia       Past Surgical History:     Past Surgical History:   Procedure Laterality Date   • WISDOM TOOTH EXTRACTION      all 4 removed      Family History:     Family History   Problem Relation Age of Onset   • No Known Problems Mother    • Hyperlipidemia Father    • No Known Problems Brother    • Prostate cancer Maternal Grandfather    • Colon cancer Maternal Grandfather    • Alzheimer's disease Paternal Grandmother    • Alcohol abuse Paternal Grandfather          in early 63's   • No Known Problems Daughter    • Mental illness Neg Hx       Social History:     Social History     Socioeconomic History   • Marital status: /Civil Union     Spouse name: Magi   • Number of children: 1   • Years of education: None   • Highest education level: None   Occupational History   • None   Tobacco Use   • Smoking status: Never   • Smokeless tobacco: Current     Types: Chew   • Tobacco comments:     couple times a month. Started .    Vaping Use   • Vaping Use: Never used   Substance and Sexual Activity   • Alcohol use: Yes     Comment: 6 beverages/week - beer, sometimes whiskey   • Drug use: Never   • Sexual activity: None   Other Topics Concern   • None   Social History Narrative    Who lives in your home: wife and daughter    What type of home do you live in: Single house    Age of your home: 38 yrs old    How long have you been living there: 3 years    Type of heat: Forced hot air    Type of fuel: Gas    What type of adalgisa is in your bedroom: Carpet    Do you have the following in or near your home:    Air products: Central air and Humidifiers in bedrooms     Pests: None    Pets:1Dog    Are pets allowed in bedroom: Yes    Open fields, wooded areas nearby: Open fields    Basement: Mostly Dry - couple spots 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, and Finished    Exposure to second hand smoke: No        Habits:    Caffeine: Current; Amount: 2 cups/day coffee, #years 15    Chocolate: approx once a week    Other:     Social Determinants of Health     Financial Resource Strain: Not on file   Food Insecurity: Not on file   Transportation Needs: Not on file   Physical Activity: Not on file   Stress: Not on file   Social Connections: Not on file   Intimate Partner Violence: Not on file   Housing Stability: Not on file      Current Medications:     Current Outpatient Medications   Medication Sig Dispense Refill   • acetaminophen (TYLENOL) 650 mg CR tablet Take 650 mg by mouth every 8 (eight) hours as needed for mild pain     • fexofenadine (ALLEGRA) 180 MG tablet Take 180 mg by mouth daily     • Multiple Vitamins-Minerals (MENS MULTIVITAMIN PO) Take by mouth     • tiotropium (SPIRIVA RESPIMAT) 1.25 MCG/ACT AERS inhaler Inhale 2 puffs daily 4 g 11     No current facility-administered medications for this visit. Allergies:      Allergies   Allergen Reactions   • Naproxen Hives   • Dust Mite Extract Allergic Rhinitis   • Mixed Grasses Allergic Rhinitis      Physical Exam:     /80   Pulse 74   Temp 97.9 °F (36.6 °C) (Tympanic)   Resp 18   Ht 5' 10" (1.778 m) Wt 117 kg (258 lb 6 oz)   BMI 37.07 kg/m²     Physical Exam  Vitals and nursing note reviewed. Constitutional:       General: He is not in acute distress. Appearance: Normal appearance. He is well-developed and normal weight. He is not ill-appearing, toxic-appearing or diaphoretic. HENT:      Head: Normocephalic and atraumatic. Right Ear: Tympanic membrane, ear canal and external ear normal. There is no impacted cerumen. Left Ear: Tympanic membrane, ear canal and external ear normal. There is no impacted cerumen. Nose: Nose normal.      Mouth/Throat:      Mouth: Mucous membranes are moist.      Pharynx: Oropharynx is clear. No oropharyngeal exudate or posterior oropharyngeal erythema. Eyes:      General: No scleral icterus. Right eye: No discharge. Left eye: No discharge. Extraocular Movements: Extraocular movements intact. Conjunctiva/sclera: Conjunctivae normal.      Pupils: Pupils are equal, round, and reactive to light. Cardiovascular:      Rate and Rhythm: Normal rate and regular rhythm. Pulses: Normal pulses. Heart sounds: Normal heart sounds. No murmur heard. No friction rub. No gallop. Pulmonary:      Effort: Pulmonary effort is normal. No respiratory distress. Breath sounds: Normal breath sounds. No stridor. No wheezing, rhonchi or rales. Chest:      Chest wall: No tenderness. Abdominal:      General: Abdomen is flat. Bowel sounds are normal. There is no distension. Palpations: Abdomen is soft. There is no mass. Tenderness: There is no abdominal tenderness. There is no guarding or rebound. Hernia: No hernia is present. Musculoskeletal:         General: No swelling. Normal range of motion. Cervical back: Neck supple. Skin:     General: Skin is warm and dry. Capillary Refill: Capillary refill takes less than 2 seconds. Neurological:      General: No focal deficit present.       Mental Status: He is alert and oriented to person, place, and time. Psychiatric:         Mood and Affect: Mood normal.         Behavior: Behavior normal.         Thought Content:  Thought content normal.         Judgment: Judgment normal.          Quinn Claros MD  [facility]

## 2023-08-26 ENCOUNTER — OFFICE VISIT (OUTPATIENT)
Dept: URGENT CARE | Facility: CLINIC | Age: 43
End: 2023-08-26

## 2023-08-26 VITALS
TEMPERATURE: 97.4 F | OXYGEN SATURATION: 98 % | BODY MASS INDEX: 35.84 KG/M2 | DIASTOLIC BLOOD PRESSURE: 90 MMHG | HEART RATE: 77 BPM | RESPIRATION RATE: 18 BRPM | SYSTOLIC BLOOD PRESSURE: 130 MMHG | WEIGHT: 256 LBS | HEIGHT: 71 IN

## 2023-08-26 DIAGNOSIS — L23.7 POISON IVY DERMATITIS: Primary | ICD-10-CM

## 2023-08-26 RX ORDER — CLOBETASOL PROPIONATE 0.5 MG/G
CREAM TOPICAL 2 TIMES DAILY
Qty: 30 G | Refills: 0 | Status: SHIPPED | OUTPATIENT
Start: 2023-08-26

## 2023-08-26 RX ORDER — METHYLPREDNISOLONE SODIUM SUCCINATE 40 MG/ML
40 INJECTION, POWDER, LYOPHILIZED, FOR SOLUTION INTRAMUSCULAR; INTRAVENOUS ONCE
Status: COMPLETED | OUTPATIENT
Start: 2023-08-26 | End: 2023-08-26

## 2023-08-26 RX ORDER — METHYLPREDNISOLONE 4 MG/1
TABLET ORAL
Qty: 1 EACH | Refills: 0 | Status: SHIPPED | OUTPATIENT
Start: 2023-08-26

## 2023-08-26 RX ADMIN — METHYLPREDNISOLONE SODIUM SUCCINATE 40 MG: 40 INJECTION, POWDER, LYOPHILIZED, FOR SOLUTION INTRAMUSCULAR; INTRAVENOUS at 13:51

## 2023-08-26 NOTE — PROGRESS NOTES
[facility]        NAME: Randall Antonio is a 38 y.o. male  : 1980    MRN: [id number]  DATE: 2023  TIME: 1:37PM    Assessment and Plan   Poison ivy dermatitis [L23.7]  1. Poison ivy dermatitis  methylPREDNISolone sodium succinate (Solu-MEDROL) injection 40 mg    methylPREDNISolone 4 MG tablet therapy pack    clobetasol (TEMOVATE) 0.05 % cream            Patient Instructions   Poison Ivy Dermatitis:   -The patients hx and presentation is consistent with poison ivy dermatitis. Will give Solumedrol IM today 40mg. Will treat with Medrol Dose Gulshan to be taken as prescribed starting tomorrow. Take with food. -Stay well hydrated  -Cool compress for comfort   -Clobetasol topically for comfort and reduction of itching  -Benadryl for itching as discussed  -Oatmeal bath for comfort   -Keep the rash clean and dry otherwise. Monitor for signs of infection.   -If your rash worsens or persist see your PCP for follow up or consider Dermatology follow up     Follow up with PCP in 3-5 days. Proceed to  ER if symptoms worsen. Chief Complaint     Chief Complaint   Patient presents with   • Poison Ivy     X 4 days - poison ivy rash - still weeping. On face, arms, legs and groin. Using Tech-Nu and Calamine. History of Present Illness       The patient is a 46-year-old male who presents today for concern for poison ivy rash x 4 days. The patient states that the rash is highly pruritic, he has been scrubbing with tech-nu and placing calamine lotion. He states that the rash is on the facial area and upper extremities. Today he has new lesions on the groin. The lesions are oozing a serosanguineous drainage. No wheezing, cp, trouble swallowing. No new detergents. No travel or sick contacts. No fever or chills. Review of Systems   Review of Systems   Constitutional: Negative for activity change, appetite change, chills, diaphoresis, fatigue and fever.    HENT: Negative for facial swelling, sore throat and trouble swallowing. Respiratory: Negative for chest tightness, shortness of breath, wheezing and stridor. Cardiovascular: Negative for chest pain and palpitations. Skin: Positive for rash. Allergic/Immunologic: Negative for environmental allergies, food allergies and immunocompromised state. Neurological: Negative for dizziness and light-headedness. Hematological: Negative for adenopathy. Does not bruise/bleed easily. Current Medications       Current Outpatient Medications:   •  acetaminophen (TYLENOL) 650 mg CR tablet, Take 650 mg by mouth every 8 (eight) hours as needed for mild pain, Disp: , Rfl:   •  clobetasol (TEMOVATE) 0.05 % cream, Apply topically 2 (two) times a day, Disp: 30 g, Rfl: 0  •  fexofenadine (ALLEGRA) 180 MG tablet, Take 180 mg by mouth daily, Disp: , Rfl:   •  methylPREDNISolone 4 MG tablet therapy pack, Use as directed on package, Disp: 1 each, Rfl: 0  •  Multiple Vitamins-Minerals (MENS MULTIVITAMIN PO), Take by mouth, Disp: , Rfl:   •  tiotropium (SPIRIVA RESPIMAT) 1.25 MCG/ACT AERS inhaler, Inhale 2 puffs daily (Patient taking differently: Inhale 2 puffs daily), Disp: 4 g, Rfl: 11  No current facility-administered medications for this visit.     Current Allergies     Allergies as of 08/26/2023 - Reviewed 08/26/2023   Allergen Reaction Noted   • Naproxen Hives 01/06/2022   • Dust mite extract Allergic Rhinitis 01/25/2022   • Mixed grasses Allergic Rhinitis 01/25/2022            The following portions of the patient's history were reviewed and updated as appropriate: allergies, current medications, past family history, past medical history, past social history, past surgical history and problem list.     Past Medical History:   Diagnosis Date   • Hyperlipidemia        Past Surgical History:   Procedure Laterality Date   • WISDOM TOOTH EXTRACTION      all 4 removed       Family History   Problem Relation Age of Onset   • No Known Problems Mother    • Hyperlipidemia Father    • No Known Problems Brother    • Prostate cancer Maternal Grandfather    • Colon cancer Maternal Grandfather    • Alzheimer's disease Paternal Grandmother    • Alcohol abuse Paternal Grandfather          in early 63's   • No Known Problems Daughter    • Mental illness Neg Hx          Medications have been verified. Objective   /90   Pulse 77   Temp (!) 97.4 °F (36.3 °C)   Resp 18   Ht 5' 11" (1.803 m)   Wt 116 kg (256 lb)   SpO2 98%   BMI 35.70 kg/m²   No LMP for male patient. Physical Exam     Physical Exam  Vitals and nursing note reviewed. Constitutional:       General: He is not in acute distress. Appearance: He is well-developed. He is not ill-appearing, toxic-appearing or diaphoretic. HENT:      Mouth/Throat:      Pharynx: Uvula midline. Cardiovascular:      Rate and Rhythm: Normal rate and regular rhythm. Pulses: Normal pulses. Heart sounds: Normal heart sounds, S1 normal and S2 normal. No murmur heard. Pulmonary:      Effort: Pulmonary effort is normal. No tachypnea, accessory muscle usage or respiratory distress. Breath sounds: Normal breath sounds. No stridor. No decreased breath sounds, wheezing, rhonchi or rales. Skin:     Capillary Refill: Capillary refill takes less than 2 seconds. Findings: Rash present. Rash is vesicular (There is a diffuse erythematous, vesicular, maculopapular rash that is oozing a serosanginous drainage. The rash is the most severe on the forearms. ).

## 2023-08-26 NOTE — PATIENT INSTRUCTIONS
Poison Ivy Dermatitis:   -The patients hx and presentation is consistent with poison ivy dermatitis. Will give Solumedrol IM today 40mg. Will treat with Medrol Dose Gulshan to be taken as prescribed starting tomorrow. Take with food. -Stay well hydrated  -Cool compress for comfort   -Clobetasol topically for comfort and reduction of itching  -Benadryl for itching as discussed  -Oatmeal bath for comfort   -Keep the rash clean and dry otherwise.  Monitor for signs of infection.   -If your rash worsens or persist see your PCP for follow up or consider Dermatology follow up

## 2023-09-07 ENCOUNTER — APPOINTMENT (OUTPATIENT)
Dept: LAB | Facility: CLINIC | Age: 43
End: 2023-09-07
Payer: COMMERCIAL

## 2023-09-07 DIAGNOSIS — Z11.59 NEED FOR HEPATITIS C SCREENING TEST: ICD-10-CM

## 2023-09-07 DIAGNOSIS — Z13.6 SCREENING FOR CARDIOVASCULAR, RESPIRATORY, AND GENITOURINARY DISEASES: ICD-10-CM

## 2023-09-07 DIAGNOSIS — Z13.89 SCREENING FOR CARDIOVASCULAR, RESPIRATORY, AND GENITOURINARY DISEASES: ICD-10-CM

## 2023-09-07 DIAGNOSIS — Z13.29 SCREENING FOR THYROID DISORDER: ICD-10-CM

## 2023-09-07 DIAGNOSIS — Z13.83 SCREENING FOR CARDIOVASCULAR, RESPIRATORY, AND GENITOURINARY DISEASES: ICD-10-CM

## 2023-09-07 LAB
ALBUMIN SERPL BCP-MCNC: 4.1 G/DL (ref 3.5–5)
ALP SERPL-CCNC: 59 U/L (ref 34–104)
ALT SERPL W P-5'-P-CCNC: 23 U/L (ref 7–52)
ANION GAP SERPL CALCULATED.3IONS-SCNC: 8 MMOL/L
AST SERPL W P-5'-P-CCNC: 17 U/L (ref 13–39)
BASOPHILS # BLD AUTO: 0.05 THOUSANDS/ÂΜL (ref 0–0.1)
BASOPHILS NFR BLD AUTO: 1 % (ref 0–1)
BILIRUB SERPL-MCNC: 0.51 MG/DL (ref 0.2–1)
BUN SERPL-MCNC: 15 MG/DL (ref 5–25)
CALCIUM SERPL-MCNC: 9.2 MG/DL (ref 8.4–10.2)
CHLORIDE SERPL-SCNC: 106 MMOL/L (ref 96–108)
CHOLEST SERPL-MCNC: 209 MG/DL
CO2 SERPL-SCNC: 25 MMOL/L (ref 21–32)
CREAT SERPL-MCNC: 0.9 MG/DL (ref 0.6–1.3)
EOSINOPHIL # BLD AUTO: 0.38 THOUSAND/ÂΜL (ref 0–0.61)
EOSINOPHIL NFR BLD AUTO: 6 % (ref 0–6)
ERYTHROCYTE [DISTWIDTH] IN BLOOD BY AUTOMATED COUNT: 12.5 % (ref 11.6–15.1)
GFR SERPL CREATININE-BSD FRML MDRD: 106 ML/MIN/1.73SQ M
GLUCOSE P FAST SERPL-MCNC: 90 MG/DL (ref 65–99)
HCT VFR BLD AUTO: 43.5 % (ref 36.5–49.3)
HDLC SERPL-MCNC: 50 MG/DL
HGB BLD-MCNC: 14.3 G/DL (ref 12–17)
IMM GRANULOCYTES # BLD AUTO: 0.01 THOUSAND/UL (ref 0–0.2)
IMM GRANULOCYTES NFR BLD AUTO: 0 % (ref 0–2)
LDLC SERPL CALC-MCNC: 138 MG/DL (ref 0–100)
LYMPHOCYTES # BLD AUTO: 2.61 THOUSANDS/ÂΜL (ref 0.6–4.47)
LYMPHOCYTES NFR BLD AUTO: 42 % (ref 14–44)
MCH RBC QN AUTO: 30 PG (ref 26.8–34.3)
MCHC RBC AUTO-ENTMCNC: 32.9 G/DL (ref 31.4–37.4)
MCV RBC AUTO: 91 FL (ref 82–98)
MONOCYTES # BLD AUTO: 0.48 THOUSAND/ÂΜL (ref 0.17–1.22)
MONOCYTES NFR BLD AUTO: 8 % (ref 4–12)
NEUTROPHILS # BLD AUTO: 2.64 THOUSANDS/ÂΜL (ref 1.85–7.62)
NEUTS SEG NFR BLD AUTO: 43 % (ref 43–75)
NRBC BLD AUTO-RTO: 0 /100 WBCS
PLATELET # BLD AUTO: 245 THOUSANDS/UL (ref 149–390)
PMV BLD AUTO: 9.8 FL (ref 8.9–12.7)
POTASSIUM SERPL-SCNC: 4.3 MMOL/L (ref 3.5–5.3)
PROT SERPL-MCNC: 7.1 G/DL (ref 6.4–8.4)
RBC # BLD AUTO: 4.77 MILLION/UL (ref 3.88–5.62)
SODIUM SERPL-SCNC: 139 MMOL/L (ref 135–147)
TRIGL SERPL-MCNC: 104 MG/DL
TSH SERPL DL<=0.05 MIU/L-ACNC: 2.44 UIU/ML (ref 0.45–4.5)
WBC # BLD AUTO: 6.17 THOUSAND/UL (ref 4.31–10.16)

## 2023-09-07 PROCEDURE — 36415 COLL VENOUS BLD VENIPUNCTURE: CPT

## 2023-09-07 PROCEDURE — 85025 COMPLETE CBC W/AUTO DIFF WBC: CPT

## 2023-09-07 PROCEDURE — 80061 LIPID PANEL: CPT

## 2023-09-07 PROCEDURE — 84443 ASSAY THYROID STIM HORMONE: CPT

## 2023-09-07 PROCEDURE — 80053 COMPREHEN METABOLIC PANEL: CPT

## 2023-09-07 PROCEDURE — 86803 HEPATITIS C AB TEST: CPT

## 2023-09-08 LAB — HCV AB SER QL: NORMAL

## 2023-09-20 ENCOUNTER — TELEPHONE (OUTPATIENT)
Age: 43
End: 2023-09-20

## 2023-09-20 NOTE — TELEPHONE ENCOUNTER
Patient called stating he brought in form for his job the day of his physical appointment back in August on the 22nd. He is asking if PCP has had a chance to fill this out yet. Please return phone call to patient once this is complete.

## 2023-09-21 NOTE — TELEPHONE ENCOUNTER
Patient called he said  You can fax the  form to [facility]    ---there should be directions on the form on how to submit it   ---his wife was just in and had a physical she had the same form  And it was done correctly   Any questions call the patient

## 2024-01-24 ENCOUNTER — OFFICE VISIT (OUTPATIENT)
Dept: FAMILY MEDICINE CLINIC | Facility: CLINIC | Age: 44
End: 2024-01-24
Payer: COMMERCIAL

## 2024-01-24 VITALS
SYSTOLIC BLOOD PRESSURE: 110 MMHG | WEIGHT: 259 LBS | DIASTOLIC BLOOD PRESSURE: 84 MMHG | TEMPERATURE: 98.3 F | BODY MASS INDEX: 36.12 KG/M2 | RESPIRATION RATE: 18 BRPM | HEART RATE: 66 BPM

## 2024-01-24 DIAGNOSIS — M77.8 TENDONITIS OF SHOULDER, LEFT: Primary | ICD-10-CM

## 2024-01-24 PROCEDURE — 99213 OFFICE O/P EST LOW 20 MIN: CPT | Performed by: FAMILY MEDICINE

## 2024-01-24 RX ORDER — PREDNISONE 20 MG/1
TABLET ORAL
Qty: 32 TABLET | Refills: 0 | Status: SHIPPED | OUTPATIENT
Start: 2024-01-24

## 2024-01-24 NOTE — PROGRESS NOTES
Assessment/Plan:    1. Tendonitis of shoulder, left  -     XR shoulder 2+ vw left; Future; Expected date: 01/24/2024  -     predniSONE 20 mg tablet; 4 tabs for three days, 3 tabs for three days, 2 tabs for three days, 1 tab for three days, 1/2 tab for 4 days  -     Ambulatory Referral to Orthopedic Surgery; Future  -     Ambulatory Referral to Physical Therapy; Future    Seems like tendonitis, started on meds, x ray ordered and therapy.  May need ortho.  Will follow results        There are no Patient Instructions on file for this visit.    No follow-ups on file.    Subjective:      Patient ID: Tiburcio Bell is a 43 y.o. male.    Chief Complaint   Patient presents with   • Shoulder Pain     Left      couple months        sas/cma       Pt is here for same day appt  Pt states over the last month or two pt starrted developing shoulder pain  He has a 4 month old boy states its hard for him to hold him  Pt states he sleeps on his back with his arm,s over his head. Laying on his left side is painful. Driving hurts.    States the thumb on that side hurts as well        The following portions of the patient's history were reviewed and updated as appropriate: allergies, current medications, past family history, past medical history, past social history, past surgical history and problem list.    Review of Systems   Musculoskeletal:  Positive for arthralgias.         Current Outpatient Medications   Medication Sig Dispense Refill   • acetaminophen (TYLENOL) 650 mg CR tablet Take 650 mg by mouth every 8 (eight) hours as needed for mild pain     • Cholecalciferol (VITAMIN D3 PO) Take by mouth     • fexofenadine (ALLEGRA) 180 MG tablet Take 180 mg by mouth daily     • Multiple Vitamins-Minerals (MENS MULTIVITAMIN PO) Take by mouth     • predniSONE 20 mg tablet 4 tabs for three days, 3 tabs for three days, 2 tabs for three days, 1 tab for three days, 1/2 tab for 4 days 32 tablet 0   • tiotropium (SPIRIVA RESPIMAT) 1.25 MCG/ACT  AERS inhaler Inhale 2 puffs daily 4 g 11     No current facility-administered medications for this visit.       Objective:    /84   Pulse 66   Temp 98.3 °F (36.8 °C)   Resp 18   Wt 117 kg (259 lb)   BMI 36.12 kg/m²        Physical Exam  Musculoskeletal:      Comments: Some pain with isometric flexion at shoulder.  Pt is not terrible tender  Pain with isometic resistance at shoulder to internal rotation  No deformity                Jose F Lombardi, DO

## 2024-01-26 ENCOUNTER — APPOINTMENT (OUTPATIENT)
Dept: RADIOLOGY | Facility: CLINIC | Age: 44
End: 2024-01-26
Payer: COMMERCIAL

## 2024-01-26 ENCOUNTER — OFFICE VISIT (OUTPATIENT)
Dept: OBGYN CLINIC | Facility: CLINIC | Age: 44
End: 2024-01-26
Payer: COMMERCIAL

## 2024-01-26 VITALS
SYSTOLIC BLOOD PRESSURE: 120 MMHG | HEART RATE: 62 BPM | HEIGHT: 71 IN | BODY MASS INDEX: 36.68 KG/M2 | WEIGHT: 262 LBS | DIASTOLIC BLOOD PRESSURE: 78 MMHG

## 2024-01-26 DIAGNOSIS — M77.8 TENDONITIS OF SHOULDER, LEFT: ICD-10-CM

## 2024-01-26 DIAGNOSIS — M75.82 TENDINITIS OF LEFT ROTATOR CUFF: ICD-10-CM

## 2024-01-26 PROCEDURE — 73030 X-RAY EXAM OF SHOULDER: CPT

## 2024-01-26 PROCEDURE — 99203 OFFICE O/P NEW LOW 30 MIN: CPT | Performed by: ORTHOPAEDIC SURGERY

## 2024-01-26 NOTE — PROGRESS NOTES
Orthopedic Sports Medicine New Patient Visit     Assesment:   43 y.o. male with rotator cuff tendinitis of the left shoulder    Plan:    Tiburcio is a pleasant 43-year-old male who presents to the office today for initial evaluation of his left shoulder.  After obtaining a thorough history, orthopedic exam, and reviewing imaging I believe his symptoms are consistent with rotator cuff tendinitis of the left shoulder. I recommend he attend physical therapy, which he has already set up an evaluation. A corticosteroid injection should be reserved for when or if his pain persists after attending physical therapy. He was amenable to this plan today. He will follow-up in 6 weeks for re-evaluation of the left shoulder.        Follow up:    Return in about 6 weeks (around 3/8/2024) for Recheck.        Chief Complaint   Patient presents with   • Left Shoulder - Pain       History of Present Illness:    The patient is a 43 y.o., right hand dominant, male, who presents to the office for initial evaluation of his left shoulder.  He was referred to me by his PCP.  He was prescribed a tapered prescription of prednisone for the left shoulder on 1/24/2024. The left shoulder has been bothering him for about one month. He had some soreness about the elbow. He has a 4 month old son and has difficulty holding him without pain. He sleeps with his arms above his head, however he is unable to do so without pain. He is also unable to sleep on the left side. He indicates his pain is located in the anterior shoulder. He reports sudden movements are painful. His pain ranges from 4-7/10 on the pain scale.      Shoulder Surgical History:  None    Past Medical, Social and Family History:  Past Medical History:   Diagnosis Date   • Hyperlipidemia      Past Surgical History:   Procedure Laterality Date   • WISDOM TOOTH EXTRACTION      all 4 removed     Allergies   Allergen Reactions   • Naproxen Hives   • Dust Mite Extract Allergic Rhinitis   • Mixed  Grasses Allergic Rhinitis     Current Outpatient Medications on File Prior to Visit   Medication Sig Dispense Refill   • acetaminophen (TYLENOL) 650 mg CR tablet Take 650 mg by mouth every 8 (eight) hours as needed for mild pain     • Cholecalciferol (VITAMIN D3 PO) Take by mouth     • fexofenadine (ALLEGRA) 180 MG tablet Take 180 mg by mouth daily     • Multiple Vitamins-Minerals (MENS MULTIVITAMIN PO) Take by mouth     • predniSONE 20 mg tablet 4 tabs for three days, 3 tabs for three days, 2 tabs for three days, 1 tab for three days, 1/2 tab for 4 days 32 tablet 0   • tiotropium (SPIRIVA RESPIMAT) 1.25 MCG/ACT AERS inhaler Inhale 2 puffs daily 4 g 11     No current facility-administered medications on file prior to visit.     Social History     Socioeconomic History   • Marital status: /Civil Union     Spouse name: Magi   • Number of children: 2   • Years of education: Not on file   • Highest education level: Not on file   Occupational History   • Not on file   Tobacco Use   • Smoking status: Never   • Smokeless tobacco: Current     Types: Chew   • Tobacco comments:     couple times a month. Started 2010.   Vaping Use   • Vaping status: Never Used   Substance and Sexual Activity   • Alcohol use: Yes     Comment: 3-5 beverages/week - beer, sometimes whiskey   • Drug use: Never   • Sexual activity: Not on file   Other Topics Concern   • Not on file   Social History Narrative    Who lives in your home: wife and daughter    What type of home do you live in: Single house    Age of your home: 40 yrs old    How long have you been living there: 3 years    Type of heat: Forced hot air    Type of fuel: Gas    What type of adalgisa is in your bedroom: Carpet    Do you have the following in or near your home:    Air products: Central air and Humidifiers in bedrooms     Pests: None    Pets:1Dog    Are pets allowed in bedroom: Yes    Open fields, wooded areas nearby: Open fields    Basement: Mostly Dry - couple spots  "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, and Finished    Exposure to second hand smoke: No        Habits:    Caffeine: Current; Amount: 2 cups/day coffee, #years 15    Chocolate: approx once a week    Other:     Social Determinants of Health     Financial Resource Strain: Not on file   Food Insecurity: Not on file   Transportation Needs: Not on file   Physical Activity: Insufficiently Active (10/10/2023)    Exercise Vital Sign    • Days of Exercise per Week: 4 days    • Minutes of Exercise per Session: 30 min   Stress: Not on file   Social Connections: Not on file   Intimate Partner Violence: Not on file   Housing Stability: Not on file         I have reviewed the past medical, surgical, social and family history, medications and allergies as documented in the EMR.    Review of systems: ROS is negative other than that noted in the HPI.  Constitutional: Negative for fatigue and fever.   HENT: Negative for sore throat.    Respiratory: Negative for shortness of breath.    Cardiovascular: Negative for chest pain.   Gastrointestinal: Negative for abdominal pain.   Endocrine: Negative for cold intolerance and heat intolerance.   Genitourinary: Negative for flank pain.   Musculoskeletal: Negative for back pain.   Skin: Negative for rash.   Allergic/Immunologic: Negative for immunocompromised state.   Neurological: Negative for dizziness.   Psychiatric/Behavioral: Negative for agitation.      Physical Exam:    Blood pressure 120/78, pulse 62, height 5' 11\" (1.803 m), weight 119 kg (262 lb).    General/Constitutional: NAD, well developed, well nourished  HENT: Normocephalic, atraumatic  CV: Intact distal pulses, regular rate  Resp: No respiratory distress or labored breathing  Abdomen: soft, nondistended, non tender   Lymphatic: No lymphadenopathy palpated  Neuro: Alert and Oriented x 3, no focal deficits  Psych: Normal mood, normal affect  Skin: Warm, dry, no rashes, no erythema      Shoulder Exam:      Inspection: No " ecchymosis, edema, or deformity. No visualized wounds or skin lesions   Palpation: no significant tenderness  Active Motion:       FF: 170°        ER: 70°        IR: T12  Strength: 5/5 empty can, 5/5 ER,  5/5 IR with significant pain  Sensory - SILT in the Radial / Ulnar / Median / Axillary nerve distributions  Motor - AIN / PIN / Radial / Ulnar / Median / Axillary motor nerves in tact  Palpable Radial pulse  Cap refill <2secs in all digits        Imaging    I reviewed and interpreted X-rays of the left shoulder which show mild acromioclavicular arthritis.    Scribe Attestation    I,:  Hellen Noriega am acting as a scribe while in the presence of the attending physician.:       I,:  Brando Bustos MD personally performed the services described in this documentation    as scribed in my presence.:

## 2024-01-30 ENCOUNTER — EVALUATION (OUTPATIENT)
Dept: PHYSICAL THERAPY | Facility: CLINIC | Age: 44
End: 2024-01-30
Payer: COMMERCIAL

## 2024-01-30 DIAGNOSIS — M25.512 LEFT SHOULDER PAIN, UNSPECIFIED CHRONICITY: Primary | ICD-10-CM

## 2024-01-30 DIAGNOSIS — M75.82 TENDONITIS OF LEFT ROTATOR CUFF: ICD-10-CM

## 2024-01-30 DIAGNOSIS — M77.8 TENDONITIS OF SHOULDER, LEFT: ICD-10-CM

## 2024-01-30 PROCEDURE — 97162 PT EVAL MOD COMPLEX 30 MIN: CPT

## 2024-01-30 PROCEDURE — 97110 THERAPEUTIC EXERCISES: CPT

## 2024-01-30 NOTE — PROGRESS NOTES
PT Evaluation   Today's date: 2024  Patient name: Tiburcio Bell  : 1980  MRN: [id number]  Referring provider: Lombardi, Frank, DO  Dx:   Encounter Diagnosis     ICD-10-CM    1. Left shoulder pain, unspecified chronicity  M25.512 PT plan of care cert/re-cert      2. Tendonitis of left rotator cuff  M75.82 PT plan of care cert/re-cert          Assessment    Tiburcio Bell is a pleasant 43 y.o. male who presents with a referring diagnosis of tendonitis left shoulder. The patient's greatest concern is the pain he is experiencing limiting his ability to hold his kids. The primary movement system diagnosis is subacromial impingement with nociceptive pain resulting in pathoanatomical symptoms of painful arc, rotator cuff weakness, poor scapular control. The aforementioned impairments have limited the patient's ability to lift his kids, drive, sleep with his arms overhead. Differential diagnosis for this patient's presentation includes neural tension. No further referral is neccessary at this time based upon examination results. Positive prognostic indicators include motivation to improve, positive attitude, age, and acuity of symptoms. Negative prognostic indicators include multiple concurrent orthopedic problems.     Impairments: abnormal or restricted ROM, Impaired physical strength, lacks appropriate HEP, pain with function, poor posture, and poor body mechanics    Symptom Irritability: moderate    Problem List:  - painful arc   - strength deficits rotator cuff and periscapular mm     Concordant Sign:  - abduction    Patient education performed this session included: home exercise program, plan of care, expected tissue healing time, and prognosis and diagnosis    Patient verbalized excellent understanding of POC.    Please contact me if you have any questions or recommendations. Thank you for the referral and the opportunity to share in Tiburcio Bell's care.       Plan    Patient would benefit from:  Skilled PT  Planned modality interventions: biofeedback, cryotherapy, TENS, and thermotherapy (hot pack)  Planned therapy interventions: activity modification, behavior modification, body mechanics training, functional ROM exercises, graded exercise, HEP, joint mobilization, manual therapy, Aguila taping, motor coordination training, neuromuscular re-education, patient education, postural training, strengthening, stretching, therapeutic activities, and therapeutic exercises    Frequency: 2x per week  Duration in weeks: 8 weeks    Plan of Care beginning date: 1/30/2024  Plan of Care expiration date: 8 weeks - 3/26/2024  Treatment plan discussed with: patient      Goals    Short Term Goals (4 weeks):    Patient will be independent with basic HEP.  Patient will report >50% reduction in pain.  Patient will demonstrate >1/3 improvement in MMT grade as applicable  Patient will demonstrate full, painfree ROM  Patient will demonstrate good posture with all therapeutic interventions    Long Term Goals (8 weeks):  Patient will be independent in a comprehensive home exercise program  Patient FOTO score will improve to predicted/100  Patient will self-report >75% improvement in function  Patient will carry his child for 20 min  Patient will drive for 1 hour      History    History of Present Illness  Mechanism of injury: 2 small kids and he lifts them a lot. Never had any injury or issue prior. Over the past month, can't hold his baby, can't strengthen, can drive, can't sleep with his arms overhead. Prednisone has already helped. L thumb was also hurting, but denies any neck pain, sometimes intermittent elbow pain   - lots of driving   Right handed    Prior level of function: IND    Progression: improved    Previous treatment: medication  Current treatment: medication and physical therapy    Patient goals for therapy: decrease pain, increase motion, increase strength, independence with ADLs, and return to  work    Pain   1/30/2024    Current 3/10    Best 2/10    Worst 5/10     Location: lateral aspect of the shoulder  Description: stabbing  Aggravating factors:  driving, lifting the baby  Relieving factors: rest    Social Support  Lives in: multiple-level home  Lives with: young children      Physical Examination    Red Flag Screening  (+): none     Postural Assessment  Posture in Sitting: fair  Posture in Standing: fair  Postural Correction: has no consistent effect   Manual or self correction? self correction    Sensation  Light touch: intact bilaterally    Reflexes:   LEFT / RIGHT  Biceps (C5): 1+  / 1+     Cervical Spine ROM   1/30/2024    Flexion 100%    Extension 100%    Lt Rotation 100%    Rt Rotation 100%    Lt Lateral Flexion 100%    Rt Lateral Flexion 100%     Retractions RM - NE    UE AROM   1/30/2024    LEFT RIGHT     Shoulder Flexion WNL WNL    Shoulder Abduction WNL WNL    Shoulder ER C5 C5    Shoulder IR T12 T12         UE MMT   1/30/2024    LEFT RIGHT        Shoulder Flexion 4+/5 5/5    Shoulder Abduction 4*/5 5/5    Shoulder Extension 5/5 5/5    Shoulder ER  4+/5 5/5    Shoulder IR  4*/5 5/5       Elbow Flexion 4+/5 5/5    Elbow Extension 4+/5 5/5        58 80     (*) = reproduction of patient's reported pain    Scapular Mobility  < 90 deg elevation: adequate scapulohumeral rhythm  > 90 deg elevation: adequate scapulohumeral rhythm    Joint Play  Anterior Capsule: WFL  Posterior Capsule: hypomobile  Inferior Capsule: WFL    AC Joint: hypomobile  SC Joint: WFL      Mid-Thoracic Spine: hypomobile  Lower Thoracic Spine: hypomobile      Special Tests Performed  (+): Hawkin's Dustin (impingement), painful arc (impingement), and ULTT 1A (median)  (-): apprehension (GHJ instability), keila sign (biceps rupture), and ULTT 3 (ulnar)           Insurance:  AMA/CMS Eval/ Re-eval Auth #/ Referral # Total units  Start date  Expiration date Extension  Visit limitation?  PT only or  PT+OT? Co-Insurance   BC BS  1/30/2024 Pending                                                             POC Start Date POC Expiration Date Signed POC?   1/30/2024 8 weeks - 3/26/2024       Date 1/30              Units:  Used 1              Authed:  Remaining                  Precautions:   Past Medical History:   Diagnosis Date   • Hyperlipidemia        Date 1/30/2024        Visit Number IE        Manual         GHJ mobs AC tape - KT        Scapular mobs         STM                           TherEx         Chin tuck 3x10        Thoracic extension Seated 3x10        Rows and extension                                                      Neuro Re-Ed         Shoulder iso         Scap retraction 3x10                                                              TherAct         Patient education HEP and POC 10'        Posture education                                    Gait Training                                    Modalities

## 2024-02-06 ENCOUNTER — OFFICE VISIT (OUTPATIENT)
Dept: PHYSICAL THERAPY | Facility: CLINIC | Age: 44
End: 2024-02-06
Payer: COMMERCIAL

## 2024-02-06 DIAGNOSIS — M25.512 LEFT SHOULDER PAIN, UNSPECIFIED CHRONICITY: ICD-10-CM

## 2024-02-06 DIAGNOSIS — M77.8 TENDONITIS OF SHOULDER, LEFT: ICD-10-CM

## 2024-02-06 DIAGNOSIS — M75.82 TENDONITIS OF LEFT ROTATOR CUFF: Primary | ICD-10-CM

## 2024-02-06 PROCEDURE — 97112 NEUROMUSCULAR REEDUCATION: CPT

## 2024-02-06 PROCEDURE — 97110 THERAPEUTIC EXERCISES: CPT

## 2024-02-06 NOTE — PROGRESS NOTES
"Daily Note     Today's date: 2024  Patient name: Tiburcio Bell  : 1980  MRN: [id number]  Referring provider: Lombardi, Frank, DO  Dx:   Encounter Diagnosis     ICD-10-CM    1. Tendonitis of left rotator cuff  M75.82       2. Left shoulder pain, unspecified chronicity  M25.512       3. Tendonitis of shoulder, left  M77.8           Subjective: Patient with minimal pain at the top of the L shoulder today- notes some improvement with repeated cervical retractions but \"I don't like to do them.\" Noted      Objective: See treatment diary below      Assessment: Tolerated treatment well. Despite adjustments with pec stretch in doorway pt still reporting significant pain in the left supraspinatus area. Patient with no increase in pain with intro to new scap stabilization exercises today, and required minimal cueing to decrease L UT influence with rows. Patient exhibited good technique with therapeutic exercises and would benefit from continued PT to continue with scap strengthening and improve functional mobility      Plan: Continue per plan of care.  Consider retraction+ext NV?        Insurance:  AMA/CMS Eval/ Re-eval Auth #/ Referral # Total units  Start date  Expiration date Extension  Visit limitation?  PT only or  PT+OT? Co-Insurance   BC BS 2024 Pending                                                             POC Start Date POC Expiration Date Signed POC?   2024 8 weeks - 3/26/2024       Date              Units:  Used 1 2             Authed:  Remaining                  Precautions:   Past Medical History:   Diagnosis Date    Hyperlipidemia        Date   2024       Visit Number IE 2       Manual         GHJ mobs AC tape - KT ACJ tape \"x\"       First rib assessment  performed       Scapular mobs         STM  L UT 5'       Tspine   P-A gr 5 manip x2                TherEx         Chin tuck 3x10 3x10 +ext?      Thoracic extension Seated 3x10 3x10 with foam roll       Rows and " extension  GTB 3x10 each +ER/IR?      TRX incline row  2x10                Pec stretch at doorway  *pain       Prone I T Y  10x each intro                Neuro Re-Ed         Shoulder iso         Scap retraction 3x10                                                              TherAct         Patient education HEP and POC 10'        Posture education                                    Gait Training                                    Modalities

## 2024-02-07 ENCOUNTER — TELEPHONE (OUTPATIENT)
Dept: FAMILY MEDICINE CLINIC | Facility: CLINIC | Age: 44
End: 2024-02-07

## 2024-02-07 PROBLEM — M19.012 LOCALIZED OSTEOARTHRITIS OF LEFT SHOULDER: Status: ACTIVE | Noted: 2024-02-07

## 2024-02-07 NOTE — RESULT ENCOUNTER NOTE
X ray shoes signs of osteoarthritis - looks like you have seen ortho as well as physical therapy.  That is the proper course of action

## 2024-02-08 ENCOUNTER — OFFICE VISIT (OUTPATIENT)
Dept: PHYSICAL THERAPY | Facility: CLINIC | Age: 44
End: 2024-02-08
Payer: COMMERCIAL

## 2024-02-08 DIAGNOSIS — M75.82 TENDONITIS OF LEFT ROTATOR CUFF: Primary | ICD-10-CM

## 2024-02-08 DIAGNOSIS — M25.512 LEFT SHOULDER PAIN, UNSPECIFIED CHRONICITY: ICD-10-CM

## 2024-02-08 PROCEDURE — 97110 THERAPEUTIC EXERCISES: CPT

## 2024-02-08 PROCEDURE — 97112 NEUROMUSCULAR REEDUCATION: CPT

## 2024-02-08 NOTE — PROGRESS NOTES
"Daily Note     Today's date: 2024  Patient name: Tiburcio Bell  : 1980  MRN: [id number]  Referring provider: Lombardi, Frank, DO  Dx:   Encounter Diagnosis     ICD-10-CM    1. Tendonitis of left rotator cuff  M75.82       2. Left shoulder pain, unspecified chronicity  M25.512             Subjective: Patient felt tired after last visit.       Objective: See treatment diary below      Assessment: Tolerated treatment well. Able to progress RTC and periscapular strengthening with no reports of pain. Patient exhibited good technique with therapeutic exercises and would benefit from continued PT to continue with scap strengthening and improve functional mobility      Plan: Continue per plan of care.  OH activities?        Insurance:  AMA/CMS Eval/ Re-eval Auth #/ Referral # Total units  Start date  Expiration date Extension  Visit limitation?  PT only or  PT+OT? Co-Insurance   [facility] 2024 Auth #[id number]  approved. 12 visits. 24 to 24                                                             POC Start Date POC Expiration Date Signed POC?   2024 8 weeks - 3/26/2024       Date             Units:  Used 1 2 3            Authed:  Remaining                  Precautions:   Past Medical History:   Diagnosis Date   • Hyperlipidemia        Date   2024      Visit Number IE 2 3      Manual         GHJ mobs AC tape - KT ACJ tape \"x\" ACJ tape \"x\"      First rib assessment  performed       Scapular mobs         STM  L UT 5' L UT 5'      Tspine   P-A gr 5 manip x2 P-A gr 5 manip x2               TherEx         Chin tuck 3x10 3x10 X10  2x10 + ext      Thoracic extension Seated 3x10 3x10 with foam roll 3x10 with foam roll      Rows and extension  GTB 3x10 each GTB 3x10 each    2x10 GTB+ER/IR      TRX incline row  2x10 3x10               Pec stretch at doorway  *pain       Prone I T Y  10x each intro 2x10 ea               Neuro Re-Ed         Shoulder iso         Scap " retraction 3x10                                                              TherAct         Patient education HEP and POC 10'        Posture education                                    Gait Training                                    Modalities

## 2024-02-13 ENCOUNTER — OFFICE VISIT (OUTPATIENT)
Dept: PHYSICAL THERAPY | Facility: CLINIC | Age: 44
End: 2024-02-13
Payer: COMMERCIAL

## 2024-02-13 DIAGNOSIS — M25.512 LEFT SHOULDER PAIN, UNSPECIFIED CHRONICITY: ICD-10-CM

## 2024-02-13 DIAGNOSIS — M75.82 TENDONITIS OF LEFT ROTATOR CUFF: Primary | ICD-10-CM

## 2024-02-13 DIAGNOSIS — M77.8 TENDONITIS OF SHOULDER, LEFT: ICD-10-CM

## 2024-02-13 PROCEDURE — 97110 THERAPEUTIC EXERCISES: CPT

## 2024-02-13 PROCEDURE — 97112 NEUROMUSCULAR REEDUCATION: CPT

## 2024-02-13 NOTE — PROGRESS NOTES
"Daily Note     Today's date: 2024  Patient name: Tiburcio Bell  : 1980  MRN: [id number]  Referring provider: Lombardi, Frank, DO  Dx:   Encounter Diagnosis     ICD-10-CM    1. Tendonitis of left rotator cuff  M75.82       2. Left shoulder pain, unspecified chronicity  M25.512       3. Tendonitis of shoulder, left  M77.8             Subjective:  Finished steroid pack last week. More pain in the the shoulder and the L thumb than normal. Was sleeping oddly on couch with the baby- pain is in a new location in the lateral posterior shoulder.     Objective: See treatment diary below      Assessment: Tolerated treatment fair. Patient reporting pain with resistance exercises today that pt does not normally feel (especially with rows). Of note, pt with no pain during OH new motions today. Focus of exercises today on serratus anterior recruitment as pt demonstrates moderate-significant winging of the L shoulder blade. Pt with no pain during SA focused exercises.Patient demonstrated fatigue post treatment and would benefit from continued PT to continue with gains in strength and tolerance for day to day activities.       Plan: Continue per plan of care.  Single arm OH carry NV? Continue with SA exercises?        Insurance:  AMA/CMS Eval/ Re-eval Auth #/ Referral # Total units  Start date  Expiration date Extension  Visit limitation?  PT only or  PT+OT? Co-Insurance   [facility] 2024 Auth #[id number]  approved. 12 visits. 24 to 24                                                             POC Start Date POC Expiration Date Signed POC?   2024 8 weeks - 3/26/2024       Date            Units:  Used 1 2 3 4           Authed:  Remaining  11 10 9 8             Precautions:   Past Medical History:   Diagnosis Date    Hyperlipidemia        Date   2024     Visit Number IE 2 3 4     Manual         GHJ mobs AC tape - KT ACJ tape \"x\" ACJ tape \"x\" ACJ tape\"x\"      "   First rib assessment  performed       Scapular mobs         STM  L UT 5' L UT 5' L UT/supraspinatus 5'     Tspine   P-A gr 5 manip x2 P-A gr 5 manip x2               TherEx         Chin tuck 3x10 3x10 X10  2x10 + ext X10  2x10 + ext     Thoracic extension Seated 3x10 3x10 with foam roll 3x10 with foam roll 3x10 with foam roll     Rows and extension  GTB 3x10 each GTB 3x10 each    2x10 GTB+ER/IR GTB 1x10  *pain    RTB ext 2x10     TRX incline row  2x10 3x10 *held for pain     Foam roll serratus slides    2x10     Wall ball    Up/down L/R 10x through         YTB  high row with ER and OH press 2x10     Serratus punches    4# wt 3x10     Supine alphabet    2# wt     Pec stretch at doorway  *pain       Prone I T Y  10x each intro 2x10 ea               Neuro Re-Ed         Shoulder iso         Scap retraction 3x10 HEP                                                             TherAct         Patient education HEP and POC 10'        Posture education                                    Gait Training                                    Modalities

## 2024-02-15 ENCOUNTER — APPOINTMENT (OUTPATIENT)
Dept: PHYSICAL THERAPY | Facility: CLINIC | Age: 44
End: 2024-02-15
Payer: COMMERCIAL

## 2024-02-16 ENCOUNTER — APPOINTMENT (OUTPATIENT)
Dept: PHYSICAL THERAPY | Facility: CLINIC | Age: 44
End: 2024-02-16
Payer: COMMERCIAL

## 2024-02-20 ENCOUNTER — APPOINTMENT (OUTPATIENT)
Dept: PHYSICAL THERAPY | Facility: CLINIC | Age: 44
End: 2024-02-20
Payer: COMMERCIAL

## 2024-02-21 ENCOUNTER — OFFICE VISIT (OUTPATIENT)
Dept: PHYSICAL THERAPY | Facility: CLINIC | Age: 44
End: 2024-02-21
Payer: COMMERCIAL

## 2024-02-21 DIAGNOSIS — M25.512 LEFT SHOULDER PAIN, UNSPECIFIED CHRONICITY: ICD-10-CM

## 2024-02-21 DIAGNOSIS — M77.8 TENDONITIS OF SHOULDER, LEFT: ICD-10-CM

## 2024-02-21 DIAGNOSIS — M75.82 TENDONITIS OF LEFT ROTATOR CUFF: Primary | ICD-10-CM

## 2024-02-21 PROCEDURE — 97110 THERAPEUTIC EXERCISES: CPT

## 2024-02-21 PROCEDURE — 97112 NEUROMUSCULAR REEDUCATION: CPT

## 2024-02-22 ENCOUNTER — APPOINTMENT (OUTPATIENT)
Dept: PHYSICAL THERAPY | Facility: CLINIC | Age: 44
End: 2024-02-22
Payer: COMMERCIAL

## 2024-02-23 ENCOUNTER — OFFICE VISIT (OUTPATIENT)
Dept: PHYSICAL THERAPY | Facility: CLINIC | Age: 44
End: 2024-02-23
Payer: COMMERCIAL

## 2024-02-23 DIAGNOSIS — M77.8 TENDONITIS OF SHOULDER, LEFT: ICD-10-CM

## 2024-02-23 DIAGNOSIS — M25.512 LEFT SHOULDER PAIN, UNSPECIFIED CHRONICITY: ICD-10-CM

## 2024-02-23 DIAGNOSIS — M75.82 TENDONITIS OF LEFT ROTATOR CUFF: Primary | ICD-10-CM

## 2024-02-23 PROCEDURE — 97112 NEUROMUSCULAR REEDUCATION: CPT

## 2024-02-23 PROCEDURE — 97110 THERAPEUTIC EXERCISES: CPT

## 2024-02-23 NOTE — PROGRESS NOTES
"Daily Note     Today's date: 2024  Patient name: Tiburcio Bell  : 1980  MRN: [id number]  Referring provider: Lombardi, Frank, DO  Dx:   Encounter Diagnosis     ICD-10-CM    1. Tendonitis of left rotator cuff  M75.82       2. Left shoulder pain, unspecified chronicity  M25.512       3. Tendonitis of shoulder, left  M77.8           Subjective: Patient reports the shoulder feels \"better than last time\". Reporting minimal pain prior to session.       Objective: See treatment diary below      Assessment: Tolerated treatment well. Patient with no increase in pain throughout session today but did considerably fatigue with OH press and prone I,T,Y requiring increased rest break in between. Continued tactile cueing required for recruitment especially with serratus ant. Post session patient feeling \"great\" and had no pain. Patient seems to be improving at this time. Of note, this next month patient does not have any  duties, and so may find increased progression of symptoms without the added stress reported by patient. Patient exhibited good technique with therapeutic exercises and would benefit from continued PT to continue with symptom progression.       Plan: Continue per plan of care.         Insurance:  AMA/CMS Eval/ Re-eval Auth #/ Referral # Total units  Start date  Expiration date Extension  Visit limitation?  PT only or  PT+OT? Co-Insurance   [facility] 2024 Auth #[id number]  approved. 12 visits. 24 to 24                                                             POC Start Date POC Expiration Date Signed POC?   2024 8 weeks - 3/26/2024       Date     RE_ EVAL     Units:  Used 1 2 3 4 5 6         Authed:  Remaining  11 10 9 8 7 6           Precautions:   Past Medical History:   Diagnosis Date    Hyperlipidemia        Date 2024   Visit Number IE 2 3 4 5 6   Manual         GHJ mobs AC tape - KT ACJ tape \"x\" ACJ tape \"x\" " "ACJ tape\"x\"    ACJ X tape KT  ACJ X tape KT   Post glide mob 3x30 Gr 3-4   First rib assessment  performed       Scapular mobs         STM  L UT 5' L UT 5' L UT/supraspinatus 5'     Tspine     P-A gr 5 manip x2 P-A gr 5 manip x2                 TherEx         Chin tuck 3x10 3x10 X10  2x10 + ext X10  2x10 + ext  HEP   Thoracic extension Seated 3x10 3x10 with foam roll 3x10 with foam roll 3x10 with foam roll  3x10 with 1/2 foam   Rows and extension  GTB 3x10 each GTB 3x10 each    2x10 GTB+ER/IR GTB 1x10  *pain    RTB ext 2x10 GTB 3x10 Bk TB rows 3x10     BTB ext 3x10    TRX incline row  2x10 3x10 *held for pain 3x10 3x10    Foam roll serratus slides    2x10 2x10 2x10   Wall ball    Up/down L/R 10x through Up/down L/R 10x2 through Up/down L/R 10x2 through       YTB  high row with ER and OH press 2x10 YTB  high row with ER and OH press 2x10 RTB  high row with ER and OH press 2x10   Serratus punches    4# wt 3x10 4# wt 2x15 5# 2x15         Serratus wall push ups GTB 2x10    Supine alphabet    2# wt 2# x2 2#  1x   Pec stretch at doorway  *pain       Prone I T Y  10x each intro 2x10 ea  2x10 ea 2x10 each with 2# wt             Neuro Re-Ed         Shoulder iso         Scap retraction 3x10 HEP                                                             TherAct         Patient education HEP and POC 10'        Posture education                                    Gait Training                                    Modalities                               "

## 2024-02-27 ENCOUNTER — OFFICE VISIT (OUTPATIENT)
Dept: PHYSICAL THERAPY | Facility: CLINIC | Age: 44
End: 2024-02-27
Payer: COMMERCIAL

## 2024-02-27 DIAGNOSIS — M77.8 TENDONITIS OF SHOULDER, LEFT: ICD-10-CM

## 2024-02-27 DIAGNOSIS — M75.82 TENDONITIS OF LEFT ROTATOR CUFF: Primary | ICD-10-CM

## 2024-02-27 DIAGNOSIS — M25.512 LEFT SHOULDER PAIN, UNSPECIFIED CHRONICITY: ICD-10-CM

## 2024-02-27 PROCEDURE — 97110 THERAPEUTIC EXERCISES: CPT

## 2024-02-27 PROCEDURE — 97112 NEUROMUSCULAR REEDUCATION: CPT

## 2024-02-27 NOTE — PROGRESS NOTES
"Daily Note     Today's date: 2024  Patient name: Tiburcio Bell  : 1980  MRN: [id number]  Referring provider: Lombardi, Frank, DO  Dx:   Encounter Diagnosis     ICD-10-CM    1. Tendonitis of left rotator cuff  M75.82       2. Left shoulder pain, unspecified chronicity  M25.512       3. Tendonitis of shoulder, left  M77.8           Subjective: \"Better than it was a couple weeks ago.\" NO pain currently.       Objective: See treatment diary below      Assessment: Tolerated treatment well. Patient with mild increase in \"pinching\" pain with OH motions today but dissipated with repetition/laps. Patient significantly fatigued today with incorporation of OH motions and increased resistance on serratus/periscap strengthening exercises. Patient educated on occupational therapy for the lingering/worsening thumb pain. Overall patient has been making significant progress with symptoms. Patient continues to have familiar pinching pain with PROM into flexion however with distraction this pain dissipates. Patient demonstrated fatigue post treatment, exhibited good technique with therapeutic exercises, and would benefit from continued PT to continue with back to work/activity duties.       Plan: Continue per plan of care.      Insurance:  AMA/CMS Eval/ Re-eval Auth #/ Referral # Total units  Start date  Expiration date Extension  Visit limitation?  PT only or  PT+OT? Co-Insurance   [facility] 2024 Auth #[id number]  approved. 12 visits. 24 to 24                                                             POC Start Date POC Expiration Date Signed POC?   2024 8 weeks - 3/26/2024       Date    RE_ EVAL     Units:  Used 1 2 3 4 5 6 7        Authed:  Remaining  11 10 9 8 7 6 5          Precautions:   Past Medical History:   Diagnosis Date    Hyperlipidemia        Date 2024    Visit Number IE 2 3 4 5 6 7    Manual           GHJ mobs AC tape - " "KT ACJ tape \"x\" ACJ tape \"x\" ACJ tape\"x\"    ACJ X tape KT  ACJ X tape KT   Post glide mob 3x30 Gr 3-4     First rib assessment  performed         Scapular mobs           STM  L UT 5' L UT 5' L UT/supraspinatus 5'       Tspine     P-A gr 5 manip x2 P-A gr 5 manip x2                     TherEx           Chin tuck 3x10 3x10 X10  2x10 + ext X10  2x10 + ext  HEP     Thoracic extension Seated 3x10 3x10 with foam roll 3x10 with foam roll 3x10 with foam roll  3x10 with 1/2 foam 3x10 with 1/2 foam    Rows and extension  GTB 3x10 each GTB 3x10 each    2x10 GTB+ER/IR GTB 1x10  *pain    RTB ext 2x10 GTB 3x10 Bk TB rows 3x10     BTB ext 3x10  CC 17.5# 3x10     CC ext 15.5# 22.5#     19# Ext   TRX incline row  2x10 3x10 *held for pain 3x10 3x10      Foam roll serratus slides    2x10 2x10 2x10 Serratus slides and lift off RTB 2x10    Wall ball    Up/down L/R 10x through Up/down L/R 10x2 through Up/down L/R 10x2 through 4# yellow ball   10 rounds 2x        YTB  high row with ER and OH press 2x10 YTB  high row with ER and OH press 2x10 RTB  high row with ER and OH press 2x10 NV    Serratus punches    4# wt 3x10 4# wt 2x15 5# 2x15 6# 2x10          Serratus wall push ups GTB 2x10      Supine alphabet    2# wt 2# x2 2#  1x     Pec stretch at doorway  *pain         Prone I T Y  10x each intro 2x10 ea  2x10 ea 2x10 each with 2# wt  2x10 each 3# wt           Overhead carry 4# ball, 18# kb  2 hallway trips            u/l farmer carry 18# kb 2 laps hallway    Neuro Re-Ed           Shoulder iso           Scap retraction 3x10 HEP                Body Blade 3x20\" 90 deg ER/IE  2x arm straight out 3x20\"                                                           TherAct           Patient education HEP and POC 10'          Posture education                                            Gait Training                                            Modalities                                     "

## 2024-02-29 ENCOUNTER — OFFICE VISIT (OUTPATIENT)
Dept: PHYSICAL THERAPY | Facility: CLINIC | Age: 44
End: 2024-02-29
Payer: COMMERCIAL

## 2024-02-29 DIAGNOSIS — M75.82 TENDONITIS OF LEFT ROTATOR CUFF: Primary | ICD-10-CM

## 2024-02-29 DIAGNOSIS — M25.512 LEFT SHOULDER PAIN, UNSPECIFIED CHRONICITY: ICD-10-CM

## 2024-02-29 DIAGNOSIS — M77.8 TENDONITIS OF SHOULDER, LEFT: ICD-10-CM

## 2024-02-29 PROCEDURE — 97110 THERAPEUTIC EXERCISES: CPT

## 2024-02-29 NOTE — PROGRESS NOTES
"Daily Note     Today's date: 2024  Patient name: Tiburcio Bell  : 1980  MRN: [id number]  Referring provider: Lombardi, Frank, DO  Dx:   Encounter Diagnosis     ICD-10-CM    1. Tendonitis of left rotator cuff  M75.82       2. Left shoulder pain, unspecified chronicity  M25.512       3. Tendonitis of shoulder, left  M77.8             Subjective: does not feel limited in any specific activity, has not has much pain the past week.      Objective: See treatment diary below      Assessment: Tolerated treatment well. Able to progress to weight bearing interventions with no pain reported during or after. Tolerated progressions well. Patient demonstrated fatigue post treatment, exhibited good technique with therapeutic exercises, and would benefit from continued PT to continue with back to work/activity duties.       Plan: Continue per plan of care.      Insurance:  AMA/CMS Eval/ Re-eval Auth #/ Referral # Total units  Start date  Expiration date Extension  Visit limitation?  PT only or  PT+OT? Co-Insurance   [facility] 2024 Auth #[id number]  approved. 12 visits. 24 to 24                                                             POC Start Date POC Expiration Date Signed POC?   2024 8 weeks - 3/26/2024       Date  2  RE_ EVAL     Units:  Used 1 2 3 4 5 6 7 8       Authed:  Remaining  11 10 9 8 7 6 5 4         Precautions:   Past Medical History:   Diagnosis Date   • Hyperlipidemia        Date 24   Visit Number 3 4 5 6 7 8   Manual         GHJ mobs ACJ tape \"x\" ACJ tape\"x\"    ACJ X tape KT  ACJ X tape KT   Post glide mob 3x30 Gr 3-4     First rib assessment         Scapular mobs         STM L UT 5' L UT/supraspinatus 5'       Tspine  P-A gr 5 manip x2                   TherEx         Chin tuck X10  2x10 + ext X10  2x10 + ext  HEP     Thoracic extension 3x10 with foam roll 3x10 with foam roll  3x10 with 1/2 foam 3x10 with 1/2 foam  " "  Rows and extension GTB 3x10 each    2x10 GTB+ER/IR GTB 1x10  *pain    RTB ext 2x10 GTB 3x10 Bk TB rows 3x10     BTB ext 3x10  CC 17.5# 3x10     CC ext 15.5# 22.5# 3x10  ea   TRX incline row 3x10 *held for pain 3x10 3x10   Push up 8\" step 2x10   Foam roll serratus slides  2x10 2x10 2x10 Serratus slides and lift off RTB 2x10 Serratus slides and lift off RTB 3x10   Wall ball  Up/down L/R 10x through Up/down L/R 10x2 through Up/down L/R 10x2 through 4# yellow ball   10 rounds 2x 4# yellow ball   10 rounds 2x     YTB  high row with ER and OH press 2x10 YTB  high row with ER and OH press 2x10 RTB  high row with ER and OH press 2x10 NV RTB  high row with ER and OH press 2x10   Serratus punches  4# wt 3x10 4# wt 2x15 5# 2x15 6# 2x10        Serratus wall push ups GTB 2x10   CC pull with strap 22.5# x10  27.5# x10   Supine alphabet  2# wt 2# x2 2#  1x     Pec stretch at doorway         Prone I T Y 2x10 ea  2x10 ea 2x10 each with 2# wt  2x10 each 3# wt 3x10 3# on PB        Overhead carry 4# ball, 18# kb  2 hallway trips Overhead carry 18# kb  2 hallway trips        u/l farmer carry 18# kb 2 laps hallway u/l phillips carry 18# kb 2 laps hallway   Neuro Re-Ed         Shoulder iso         Scap retraction              Body Blade 3x20\" 90 deg ER/IE  2x arm straight out 3x20\" Body Blade 3x30\" 90 deg ER/IE  arm straight out 3x30\"                                                TherAct         Patient education         Posture education                                    Gait Training                                    Modalities                                 "

## 2024-03-07 ENCOUNTER — OFFICE VISIT (OUTPATIENT)
Dept: PHYSICAL THERAPY | Facility: CLINIC | Age: 44
End: 2024-03-07
Payer: COMMERCIAL

## 2024-03-07 DIAGNOSIS — M77.8 TENDONITIS OF SHOULDER, LEFT: ICD-10-CM

## 2024-03-07 DIAGNOSIS — M25.512 LEFT SHOULDER PAIN, UNSPECIFIED CHRONICITY: ICD-10-CM

## 2024-03-07 DIAGNOSIS — M75.82 TENDONITIS OF LEFT ROTATOR CUFF: Primary | ICD-10-CM

## 2024-03-07 PROCEDURE — 97110 THERAPEUTIC EXERCISES: CPT

## 2024-03-07 NOTE — PROGRESS NOTES
"Daily Note     Today's date: 3/7/2024  Patient name: Tiburcio Bell  : 1980  MRN: [id number]  Referring provider: Lombardi, Frank, DO  Dx:   Encounter Diagnosis     ICD-10-CM    1. Tendonitis of left rotator cuff  M75.82       2. Left shoulder pain, unspecified chronicity  M25.512       3. Tendonitis of shoulder, left  M77.8               Subjective: he is feeling really good, see MD next week. Only thing he has not tried is holding his kids in the left arm for prolonged periods of time      Objective: See treatment diary below      Assessment: Tolerated treatment well. Able to progress to weight bearing interventions with no pain reported during or after. Tolerated progressions well. Patient demonstrated fatigue post treatment, exhibited good technique with therapeutic exercises, and would benefit from continued PT to continue with back to work/activity duties.       Plan: Progress note during next visit.  Potential discharge next visit.     Insurance:  AMA/CMS Eval/ Re-eval Auth #/ Referral # Total units  Start date  Expiration date Extension  Visit limitation?  PT only or  PT+OT? Co-Insurance   [facility] 2024 Auth #[id number]  approved. 12 visits. 24 to 24                                                             POC Start Date POC Expiration Date Signed POC?   2024 8 weeks - 3/26/2024       Date 1/30 2/6 2/8 2/13 2/21 2/23 2/27 2/29 3/7 RE_ EVAL     Units:  Used 1 2 3 4 5 6 7 8 9      Authed:  Remaining  11 10 9 8 7 6 5 4 3        Precautions:   Past Medical History:   Diagnosis Date   • Hyperlipidemia        Date 2/13 2/21 2/23 2/27 2/29 3/7/24   Visit Number 4 5 6 7 8 9   Manual         GHJ mobs ACJ tape\"x\"    ACJ X tape KT  ACJ X tape KT   Post glide mob 3x30 Gr 3-4      First rib assessment         Scapular mobs         STM L UT/supraspinatus 5'        Tspine                     TherEx         Chin tuck X10  2x10 + ext  HEP      Thoracic extension 3x10 with foam roll  3x10 with 1/2 " "foam 3x10 with 1/2 foam     Rows and extension GTB 1x10  *pain    RTB ext 2x10 GTB 3x10 Bk TB rows 3x10     BTB ext 3x10  CC 17.5# 3x10     CC ext 15.5# 22.5# 3x10  ea 24# 3x10  ea   TRX incline row *held for pain 3x10 3x10   Push up 8\" step 2x10 TRX 3x10    Push up on BOSU 2x10    Plank w blaze pods 2x30s   Foam roll serratus slides 2x10 2x10 2x10 Serratus slides and lift off RTB 2x10 Serratus slides and lift off RTB 3x10    Wall ball Up/down L/R 10x through Up/down L/R 10x2 through Up/down L/R 10x2 through 4# yellow ball   10 rounds 2x 4# yellow ball   10 rounds 2x     YTB  high row with ER and OH press 2x10 YTB  high row with ER and OH press 2x10 RTB  high row with ER and OH press 2x10 NV RTB  high row with ER and OH press 2x10    Serratus punches 4# wt 3x10 4# wt 2x15 5# 2x15 6# 2x10        Serratus wall push ups GTB 2x10   CC pull with strap 22.5# x10  27.5# x10 CC pull with strap 29# 2x10   Supine alphabet 2# wt 2# x2 2#  1x      Pec stretch at doorway         Prone I T Y  2x10 ea 2x10 each with 2# wt  2x10 each 3# wt 3x10 3# on PB 3x10 3# on PB       Overhead carry 4# ball, 18# kb  2 hallway trips Overhead carry 18# kb  2 hallway trips OH carry 20# TT x5 laps       u/l lacey carry 18# kb 2 laps hallway u/l lacey carry 18# kb 2 laps hallway Lacey carry 20# TT 2 laps   Neuro Re-Ed         Shoulder iso         Scap retraction             Body Blade 3x20\" 90 deg ER/IE  2x arm straight out 3x20\" Body Blade 3x30\" 90 deg ER/IE  arm straight out 3x30\"                                                 TherAct         Patient education         Posture education                                    Gait Training                                    Modalities                                 "

## 2024-03-08 ENCOUNTER — OFFICE VISIT (OUTPATIENT)
Dept: OBGYN CLINIC | Facility: CLINIC | Age: 44
End: 2024-03-08
Payer: COMMERCIAL

## 2024-03-08 VITALS
DIASTOLIC BLOOD PRESSURE: 79 MMHG | SYSTOLIC BLOOD PRESSURE: 125 MMHG | HEIGHT: 71 IN | BODY MASS INDEX: 36.54 KG/M2 | WEIGHT: 261 LBS | HEART RATE: 78 BPM

## 2024-03-08 DIAGNOSIS — M75.82 TENDINITIS OF LEFT ROTATOR CUFF: Primary | ICD-10-CM

## 2024-03-08 PROCEDURE — 99212 OFFICE O/P EST SF 10 MIN: CPT | Performed by: ORTHOPAEDIC SURGERY

## 2024-03-08 NOTE — PROGRESS NOTES
Orthopedic Sports Medicine New Patient Visit     Assesment:   43 y.o. male with left rotator cuff tendinitis     Plan:    Upon examination today, the patient demonstrates full shoulder range of motion and strength without pain. I am pleased with the progress he made following PT and encouraged him to remain diligent about continuing exercises after finishing PT. We discussed that if symptoms return following a period of decreased shoulder exercises, he can restart home exercises. If symptoms persist or worsen, he can follow up as needed for discussion of additional treatment options. The patient was agreeable to this.        Chief Complaint   Patient presents with    Left Shoulder - Follow-up       History of Present Illness:    The patient is a 43 y.o., right hand dominant, male, presenting for follow up evaluation of left rotator cuff tendinitis following a course of formal PT. Today, the patient notes significantly improved symptoms, such that he no longer has pain with sleeping. He states the shoulder has not been bothersome for the past week. He has been attending PT consistently. The patient has 2 more sessions of PT next week and then he intends to continue exercises at the gym. He denies new injury/trauma, weakness, and numbness/tingling.     The patient is a .       Shoulder Surgical History:  None    Past Medical, Social and Family History:  Past Medical History:   Diagnosis Date    Hyperlipidemia      Past Surgical History:   Procedure Laterality Date    WISDOM TOOTH EXTRACTION      all 4 removed     Allergies   Allergen Reactions    Naproxen Hives    Dust Mite Extract Allergic Rhinitis    Mixed Grasses Allergic Rhinitis     Current Outpatient Medications on File Prior to Visit   Medication Sig Dispense Refill    acetaminophen (TYLENOL) 650 mg CR tablet Take 650 mg by mouth every 8 (eight) hours as needed for mild pain      Cholecalciferol (VITAMIN D3 PO) Take by mouth      fexofenadine  (ALLEGRA) 180 MG tablet Take 180 mg by mouth daily      Multiple Vitamins-Minerals (MENS MULTIVITAMIN PO) Take by mouth      predniSONE 20 mg tablet 4 tabs for three days, 3 tabs for three days, 2 tabs for three days, 1 tab for three days, 1/2 tab for 4 days 32 tablet 0    tiotropium (SPIRIVA RESPIMAT) 1.25 MCG/ACT AERS inhaler Inhale 2 puffs daily 4 g 11     No current facility-administered medications on file prior to visit.     Social History     Socioeconomic History    Marital status: /Civil Union     Spouse name: Magi    Number of children: 2    Years of education: Not on file    Highest education level: Not on file   Occupational History    Not on file   Tobacco Use    Smoking status: Never    Smokeless tobacco: Current     Types: Chew    Tobacco comments:     couple times a month. Started 2010.   Vaping Use    Vaping status: Never Used   Substance and Sexual Activity    Alcohol use: Yes     Comment: 3-5 beverages/week - beer, sometimes whiskey    Drug use: Never    Sexual activity: Not on file   Other Topics Concern    Not on file   Social History Narrative    Who lives in your home: wife and daughter    What type of home do you live in: Single house    Age of your home: 40 yrs old    How long have you been living there: 3 years    Type of heat: Forced hot air    Type of fuel: Gas    What type of adalgisa is in your bedroom: Carpet    Do you have the following in or near your home:    Air products: Central air and Humidifiers in bedrooms     Pests: None    Pets:1Dog    Are pets allowed in bedroom: Yes    Open fields, wooded areas nearby: Open fields    Basement: Mostly Dry - couple spots 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, and Finished    Exposure to second hand smoke: No        Habits:    Caffeine: Current; Amount: 2 cups/day coffee, #years 15    Chocolate: approx once a week    Other:     Social Determinants of Health     Financial Resource Strain: Not on file   Food Insecurity: Not on  "file   Transportation Needs: Not on file   Physical Activity: Insufficiently Active (10/10/2023)    Exercise Vital Sign     Days of Exercise per Week: 4 days     Minutes of Exercise per Session: 30 min   Stress: Not on file   Social Connections: Not on file   Intimate Partner Violence: Not on file   Housing Stability: Not on file         I have reviewed the past medical, surgical, social and family history, medications and allergies as documented in the EMR.    Review of systems: ROS is negative other than that noted in the HPI.  Constitutional: Negative for fatigue and fever.   HENT: Negative for sore throat.    Respiratory: Negative for shortness of breath.    Cardiovascular: Negative for chest pain.   Gastrointestinal: Negative for abdominal pain.   Endocrine: Negative for cold intolerance and heat intolerance.   Genitourinary: Negative for flank pain.   Musculoskeletal: Negative for back pain.   Skin: Negative for rash.   Allergic/Immunologic: Negative for immunocompromised state.   Neurological: Negative for dizziness.   Psychiatric/Behavioral: Negative for agitation.      Physical Exam:    Blood pressure 125/79, pulse 78, height 5' 11\" (1.803 m), weight 118 kg (261 lb).    General/Constitutional: NAD, well developed, well nourished  HENT: Normocephalic, atraumatic  CV: Intact distal pulses, regular rate  Resp: No respiratory distress or labored breathing  Abdomen: soft, nondistended, non tender   Lymphatic: No lymphadenopathy palpated  Neuro: Alert and Oriented x 3, no focal deficits  Psych: Normal mood, normal affect  Skin: Warm, dry, no rashes, no erythema      Shoulder Exam:      Inspection: No ecchymosis, edema, or deformity. No visualized wounds or skin lesions   Palpation: no AC joint or bicipital groove tenderness  Active Motion:       FF: 170° and symmetric       ER: 75° and nearly symmetric       IR: T12 and symmetric  Strength: 5/5 empty can, 5/5 ER,  5/5 IR   Sensory - SILT in the Radial / Ulnar / " Median / Axillary nerve distributions  Motor - AIN / PIN / Radial / Ulnar / Median / Axillary motor nerves in tact  Palpable Radial pulse  Cap refill <2secs in all digits    - Lift Off Test  - Franco    Imaging    I reviewed and interpreted X-rays of the left shoulder which show mild acromioclavicular arthritis.    Scribe Attestation      I,:  Kandi Duval PA-C am acting as a scribe while in the presence of the attending physician.:       I,:  Brando Bustos MD personally performed the services described in this documentation    as scribed in my presence.:

## 2024-03-13 ENCOUNTER — EVALUATION (OUTPATIENT)
Dept: PHYSICAL THERAPY | Facility: CLINIC | Age: 44
End: 2024-03-13
Payer: COMMERCIAL

## 2024-03-13 DIAGNOSIS — M25.512 LEFT SHOULDER PAIN, UNSPECIFIED CHRONICITY: ICD-10-CM

## 2024-03-13 DIAGNOSIS — M75.82 TENDONITIS OF LEFT ROTATOR CUFF: Primary | ICD-10-CM

## 2024-03-13 DIAGNOSIS — M77.8 TENDONITIS OF SHOULDER, LEFT: ICD-10-CM

## 2024-03-13 PROCEDURE — 97112 NEUROMUSCULAR REEDUCATION: CPT

## 2024-03-13 PROCEDURE — 97110 THERAPEUTIC EXERCISES: CPT

## 2024-03-13 PROCEDURE — 97530 THERAPEUTIC ACTIVITIES: CPT

## 2024-03-13 NOTE — PROGRESS NOTES
Daily Note     Today's date: 3/13/2024  Patient name: Tiburcio Bell  : 1980  MRN: [id number]  Referring provider: Lombardi, Frank, DO  Dx:   Encounter Diagnosis     ICD-10-CM    1. Tendonitis of left rotator cuff  M75.82       2. Left shoulder pain, unspecified chronicity  M25.512       3. Tendonitis of shoulder, left  M77.8                 Subjective: he is feeling really good, gym membership starts next week.      Objective: See treatment diary below    UE AROM   2024 3/13     LEFT RIGHT  LEFT RIGHT    Shoulder Flexion WNL WNL wnl wnl    Shoulder Abduction WNL WNL wnl wnl    Shoulder ER C5 C5 c5 c5    Shoulder IR T12 T12 t12 t12         UE MMT   2024 3/13/24    LEFT RIGHT  LEFT        Shoulder Flexion 4+/5 5/5 5-    Shoulder Abduction 4*/5 5/5 5    Shoulder Extension 5/5 5/5 5    Shoulder ER  4+/5 5/5 5    Shoulder IR  4*/5 5/5 5        Elbow Flexion 4+/5 5/5 5    Elbow Extension 4+/5 5/5 5         58 80 78     (*) = reproduction of patient's reported pain      Assessment: Tolerated treatment well. Able to progress to weight bearing interventions with no pain reported during or after. Tolerated progressions well. Patient demonstrated fatigue post treatment, exhibited good technique with therapeutic exercises, and would benefit from continued PT to continue with back to work/activity duties. Discharge next visit to gym program.     Goals    Short Term Goals (4 weeks):  MET  Patient will be independent with basic HEP.  Patient will report >50% reduction in pain.  Patient will demonstrate >1/3 improvement in MMT grade as applicable  Patient will demonstrate full, painfree ROM  Patient will demonstrate good posture with all therapeutic interventions    Long Term Goals (8 weeks): MET  Patient will be independent in a comprehensive home exercise program  Patient FOTO score will improve to predicted/100  Patient will self-report >75% improvement in function  Patient will carry his child for 20  "min  Patient will drive for 1 hour      Plan: Potential discharge next visit.     Insurance:  AMA/CMS Eval/ Re-eval Auth #/ Referral # Total units  Start date  Expiration date Extension  Visit limitation?  PT only or  PT+OT? Co-Insurance   [facility] BS 1/30/2024 Auth #[id number]  approved. 12 visits. 1/30/24 to 4/30/24                                                             POC Start Date POC Expiration Date Signed POC?   1/30/2024 8 weeks - 3/26/2024       Date 1/30 2/6 2/8 2/13 2/21 2/23 2/27 2/29 3/7 RE_ EVAL     Units:  Used 1 2 3 4 5 6 7 8 9 10     Authed:  Remaining  11 10 9 8 7 6 5 4 3 2       Precautions:   Past Medical History:   Diagnosis Date   • Hyperlipidemia        Date 2/13 2/21 2/23 2/27 2/29 3/7/24 3/13/24   Visit Number 4 5 6 7 8 9 10   Manual          GHJ mobs ACJ tape\"x\"    ACJ X tape KT  ACJ X tape KT   Post glide mob 3x30 Gr 3-4       First rib assessment          Scapular mobs          STM L UT/supraspinatus 5'         Tspine                       TherEx          Chin tuck X10  2x10 + ext  HEP       Thoracic extension 3x10 with foam roll  3x10 with 1/2 foam 3x10 with 1/2 foam      Rows and extension GTB 1x10  *pain    RTB ext 2x10 GTB 3x10 Bk TB rows 3x10     BTB ext 3x10  CC 17.5# 3x10     CC ext 15.5# 22.5# 3x10  ea 24# 3x10  ea Unilat 17.5# row/12.5# sh ex   TRX incline row *held for pain 3x10 3x10   Push up 8\" step 2x10 TRX 3x10    Push up on BOSU 2x10    Plank w blaze pods 2x30s TRX push up alt w TRX row 3x8    Push up on BOSU 3x8   Foam roll serratus slides 2x10 2x10 2x10 Serratus slides and lift off RTB 2x10 Serratus slides and lift off RTB 3x10     Wall ball Up/down L/R 10x through Up/down L/R 10x2 through Up/down L/R 10x2 through 4# yellow ball   10 rounds 2x 4# yellow ball   10 rounds 2x      YTB  high row with ER and OH press 2x10 YTB  high row with ER and OH press 2x10 RTB  high row with ER and OH press 2x10 NV RTB  high row with ER and OH press 2x10  Face pull 17.5# 3x10   Serratus " "punches 4# wt 3x10 4# wt 2x15 5# 2x15 6# 2x10   Circuit 2x10 ea in half kneel - 8#  - row  - curl and press  - lateral raise to stand      Serratus wall push ups GTB 2x10   CC pull with strap 22.5# x10  27.5# x10 CC pull with strap 29# 2x10 CC pull with strap 29# 2x10   Supine alphabet 2# wt 2# x2 2#  1x       Pec stretch at doorway          Prone I T Y  2x10 ea 2x10 each with 2# wt  2x10 each 3# wt 3x10 3# on PB 3x10 3# on PB 3x10 3# on PB       Overhead carry 4# ball, 18# kb  2 hallway trips Overhead carry 18# kb  2 hallway trips OH carry 20# TT x5 laps OH carry 20# TT x3 laps       u/l lacey carry 18# kb 2 laps hallway u/l lacey carry 18# kb 2 laps hallway Lacey carry 20# TT 2 laps Farmer carry 20# TT 3 laps   Neuro Re-Ed          Shoulder iso          Scap retraction              Body Blade 3x20\" 90 deg ER/IE  2x arm straight out 3x20\" Body Blade 3x30\" 90 deg ER/IE  arm straight out 3x30\"  Body blade 1 min 90-90, 1 min flexion arom, 1 min abd                                                      TherAct          Patient education          Posture education                                        Gait Training                                        Modalities                                   "

## 2024-03-15 ENCOUNTER — OFFICE VISIT (OUTPATIENT)
Dept: PHYSICAL THERAPY | Facility: CLINIC | Age: 44
End: 2024-03-15
Payer: COMMERCIAL

## 2024-03-15 DIAGNOSIS — M77.8 TENDONITIS OF SHOULDER, LEFT: ICD-10-CM

## 2024-03-15 DIAGNOSIS — M25.512 LEFT SHOULDER PAIN, UNSPECIFIED CHRONICITY: ICD-10-CM

## 2024-03-15 DIAGNOSIS — M75.82 TENDONITIS OF LEFT ROTATOR CUFF: Primary | ICD-10-CM

## 2024-03-15 PROCEDURE — 97112 NEUROMUSCULAR REEDUCATION: CPT

## 2024-03-15 PROCEDURE — 97110 THERAPEUTIC EXERCISES: CPT

## 2024-03-15 NOTE — PROGRESS NOTES
Daily Note     Today's date: 3/15/2024  Patient name: Tiburcio Bell  : 1980  MRN: [id number]  Referring provider: Lombardi, Frank, DO  Dx:   Encounter Diagnosis     ICD-10-CM    1. Tendonitis of left rotator cuff  M75.82       2. Left shoulder pain, unspecified chronicity  M25.512       3. Tendonitis of shoulder, left  M77.8         Goals     Short Term Goals (4 weeks):    Patient will be independent with basic HEP.      MET  Patient will report >50% reduction in pain.     MET  Patient will demonstrate >1/3 improvement in MMT grade as applicable MET  Patient will demonstrate full, painfree ROM     MET  Patient will demonstrate good posture with all therapeutic interventions MET      Long Term Goals (8 weeks):  Patient will be independent in a comprehensive home exercise program MET  Patient FOTO score will improve to predicted/100  MET  Patient will self-report >75% improvement in function  MET  Patient will carry his child for 20 min     MET  Patient will drive for 1 hour      MET    Subjective: Patient reports feeling great, is ready for discharge.       Objective: See treatment diary below      Assessment: Tolerated treatment well. At this time patient has met all goals and is independent in HEP and will be transitioning to home gym HEP and was encouraged to call office if he needs anything in the future. Patient demonstrated fatigue post treatment and exhibited good technique with therapeutic exercises.       Plan:  Discharge at this time     Insurance:  AMA/CMS Eval/ Re-eval Auth #/ Referral # Total units  Start date  Expiration date Extension  Visit limitation?  PT only or  PT+OT? Co-Insurance   [facility] 2024 Auth #[id number]  approved. 12 visits. 24 to 24                                                             POC Start Date POC Expiration Date Signed POC?   2024 8 weeks - 3/26/2024       Date  2/6 2/8 2/13 2/21 2/23 2/27 2/29 3/7 RE_ EVAL 3/15    Units:  Used 1 2 3 4 5 6 7 8  "9 10     Authed:  Remaining  11 10 9 8 7 6 5 4 3 2       Precautions:   Past Medical History:   Diagnosis Date    Hyperlipidemia        Date 2/13 2/21 2/23 2/27 2/29 3/7/24 3/13/24 3/15   Visit Number 4 5 6 7 8 9 10 11   Manual           GHJ mobs ACJ tape\"x\"    ACJ X tape KT  ACJ X tape KT   Post glide mob 3x30 Gr 3-4        First rib assessment           Scapular mobs           STM L UT/supraspinatus 5'          Tspine                         TherEx           Chin tuck X10  2x10 + ext  HEP        Thoracic extension 3x10 with foam roll  3x10 with 1/2 foam 3x10 with 1/2 foam       Rows and extension GTB 1x10  *pain    RTB ext 2x10 GTB 3x10 Bk TB rows 3x10     BTB ext 3x10  CC 17.5# 3x10     CC ext 15.5# 22.5# 3x10  ea 24# 3x10  ea Unilat 17.5# row/12.5# sh ex Unilat 17.5# row/12.5# sh ex   TRX incline row *held for pain 3x10 3x10   Push up 8\" step 2x10 TRX 3x10    Push up on BOSU 2x10    Plank w blaze pods 2x30s TRX push up alt w TRX row 3x8    Push up on BOSU 3x8 TRX push up alt w TRX row 3x10  Each    Foam roll serratus slides 2x10 2x10 2x10 Serratus slides and lift off RTB 2x10 Serratus slides and lift off RTB 3x10   Serratus slides and lift off RTB 3x10   Wall ball Up/down L/R 10x through Up/down L/R 10x2 through Up/down L/R 10x2 through 4# yellow ball   10 rounds 2x 4# yellow ball   10 rounds 2x       YTB  high row with ER and OH press 2x10 YTB  high row with ER and OH press 2x10 RTB  high row with ER and OH press 2x10 NV RTB  high row with ER and OH press 2x10  Face pull 17.5# 3x10 22.5# face pull   Serratus punches 4# wt 3x10 4# wt 2x15 5# 2x15 6# 2x10   Circuit 2x10 ea in half kneel - 8#  - row  - curl and press  - lateral raise to stand       Serratus wall push ups GTB 2x10   CC pull with strap 22.5# x10  27.5# x10 CC pull with strap 29# 2x10 CC pull with strap 29# 2x10 CC push ul with bar 22.5# 2x10    Supine alphabet 2# wt 2# x2 2#  1x        Pec stretch at doorway           Prone I T Y  2x10 ea 2x10 each " "with 2# wt  2x10 each 3# wt 3x10 3# on PB 3x10 3# on PB 3x10 3# on PB 3x10 3# on PB       Overhead carry 4# ball, 18# kb  2 hallway trips Overhead carry 18# kb  2 hallway trips OH carry 20# TT x5 laps OH carry 20# TT x3 laps        u/l lacey carry 18# kb 2 laps hallway u/l lacey carry 18# kb 2 laps hallway Lacey carry 20# TT 2 laps Farmer carry 20# TT 3 laps Farmer carry 20# TT 3 laps   Neuro Re-Ed           Shoulder iso           Scap retraction               Body Blade 3x20\" 90 deg ER/IE  2x arm straight out 3x20\" Body Blade 3x30\" 90 deg ER/IE  arm straight out 3x30\"  Body blade 1 min 90-90, 1 min flexion arom, 1 min abd  Body blade 1 min 90-90, 1 min flexion arom, 1 min abd   2 hands 90                                                           TherAct           Patient education           Posture education                                            Gait Training                                              Modalities                                       "

## 2024-03-19 ENCOUNTER — APPOINTMENT (OUTPATIENT)
Dept: PHYSICAL THERAPY | Facility: CLINIC | Age: 44
End: 2024-03-19
Payer: COMMERCIAL

## 2024-03-20 ENCOUNTER — APPOINTMENT (OUTPATIENT)
Dept: PHYSICAL THERAPY | Facility: CLINIC | Age: 44
End: 2024-03-20
Payer: COMMERCIAL

## 2024-03-21 ENCOUNTER — APPOINTMENT (OUTPATIENT)
Dept: PHYSICAL THERAPY | Facility: CLINIC | Age: 44
End: 2024-03-21
Payer: COMMERCIAL

## 2024-03-22 ENCOUNTER — APPOINTMENT (OUTPATIENT)
Dept: PHYSICAL THERAPY | Facility: CLINIC | Age: 44
End: 2024-03-22
Payer: COMMERCIAL

## 2024-10-09 ENCOUNTER — OFFICE VISIT (OUTPATIENT)
Dept: FAMILY MEDICINE CLINIC | Facility: CLINIC | Age: 44
End: 2024-10-09
Payer: COMMERCIAL

## 2024-10-09 VITALS
TEMPERATURE: 97 F | DIASTOLIC BLOOD PRESSURE: 80 MMHG | WEIGHT: 257 LBS | SYSTOLIC BLOOD PRESSURE: 118 MMHG | HEIGHT: 71 IN | RESPIRATION RATE: 16 BRPM | BODY MASS INDEX: 35.98 KG/M2 | HEART RATE: 76 BPM

## 2024-10-09 DIAGNOSIS — Z13.29 SCREENING FOR THYROID DISORDER: ICD-10-CM

## 2024-10-09 DIAGNOSIS — Z00.00 ROUTINE ADULT HEALTH MAINTENANCE: Primary | ICD-10-CM

## 2024-10-09 DIAGNOSIS — Z13.6 SCREENING FOR CARDIOVASCULAR CONDITION: ICD-10-CM

## 2024-10-09 DIAGNOSIS — R53.83 FATIGUE, UNSPECIFIED TYPE: ICD-10-CM

## 2024-10-09 PROCEDURE — 99396 PREV VISIT EST AGE 40-64: CPT | Performed by: NURSE PRACTITIONER

## 2024-10-09 NOTE — PROGRESS NOTES
Adult Annual Physical  Name: Tiburcio Bell      : 1980      MRN: [id number]  Encounter Provider: NIK Graay  Encounter Date: 10/9/2024   Encounter department: [facility]    Assessment & Plan  Routine adult health maintenance         Screening for cardiovascular condition    Orders:    Comprehensive metabolic panel; Future    CBC and differential; Future    Lipid Panel with LDL/HDL Ratio; Future    Comprehensive metabolic panel    CBC and differential    Lipid Panel with LDL/HDL Ratio    Screening for thyroid disorder    Orders:    TSH, 3rd generation with Free T4 reflex; Future    TSH, 3rd generation with Free T4 reflex    Fatigue, unspecified type         Immunizations and preventive care screenings were discussed with patient today. Appropriate education was printed on patient's after visit summary.    Discussed risks and benefits of prostate cancer screening. We discussed the controversial history of PSA screening for prostate cancer in the [place] as well as the risk of over detection and over treatment of prostate cancer by way of PSA screening.  The patient understands that PSA blood testing is an imperfect way to screen for prostate cancer and that elevated PSA levels in the blood may also be caused by infection, inflammation, prostatic trauma or manipulation, urological procedures, or by benign prostatic enlargement.    The role of the digital rectal examination in prostate cancer screening was also discussed and I discussed with him that there is large interobserver variability in the findings of digital rectal examination.    Counseling:  Exercise: the importance of regular exercise/physical activity was discussed. Recommend exercise 3-5 times per week for at least 30 minutes.       Depression Screening and Follow-up Plan: Patient was screened for depression during today's encounter. They screened negative with a PHQ-2 score of 0.    Tobacco Cessation Counseling:  Tobacco cessation counseling was provided.         History of Present Illness     Adult Annual Physical:  Patient presents for annual physical. Here today for CPE.  No concerns today.     Diet and Physical Activity:  - Diet/Nutrition: well balanced diet.  - Exercise: no formal exercise.    Depression Screening:  - PHQ-2 Score: 0    General Health:  - Sleep: sleeps well.  - Hearing: normal hearing bilateral ears.  - Vision: no vision problems.  - Dental: brushes teeth twice daily.     Health:    - Urinary symptoms: none.     Review of Systems   Constitutional: Negative.    Respiratory: Negative.     Cardiovascular: Negative.    Gastrointestinal: Negative.    Genitourinary: Negative.    Neurological: Negative.      Current Outpatient Medications on File Prior to Visit   Medication Sig Dispense Refill    acetaminophen (TYLENOL) 650 mg CR tablet Take 650 mg by mouth every 8 (eight) hours as needed for mild pain      Cholecalciferol (VITAMIN D3 PO) Take by mouth in the morning      fexofenadine (ALLEGRA) 180 MG tablet Take 180 mg by mouth daily      Multiple Vitamins-Minerals (MENS MULTIVITAMIN PO) Take by mouth in the morning      tiotropium (SPIRIVA RESPIMAT) 1.25 MCG/ACT AERS inhaler Inhale 2 puffs daily 4 g 11    [DISCONTINUED] predniSONE 20 mg tablet 4 tabs for three days, 3 tabs for three days, 2 tabs for three days, 1 tab for three days, 1/2 tab for 4 days 32 tablet 0     No current facility-administered medications on file prior to visit.      Social History     Tobacco Use    Smoking status: Never     Passive exposure: Never    Smokeless tobacco: Current     Types: Chew    Tobacco comments:     couple times a month. Started 2010.   Vaping Use    Vaping status: Never Used   Substance and Sexual Activity    Alcohol use: Yes     Comment: 3-5 beverages/week - beer, sometimes whiskey    Drug use: Never    Sexual activity: Not on file       Objective     /80   Pulse 76   Temp (!) 97 °F (36.1 °C)   Resp 16   " Ht 5' 11\" (1.803 m)   Wt 117 kg (257 lb)   BMI 35.84 kg/m²     Physical Exam  Vitals and nursing note reviewed.   Constitutional:       Appearance: Normal appearance. He is well-developed.   HENT:      Head: Normocephalic and atraumatic.      Right Ear: Tympanic membrane and external ear normal.      Left Ear: Tympanic membrane and external ear normal.      Nose: Nose normal.      Mouth/Throat:      Pharynx: Oropharynx is clear.   Eyes:      Extraocular Movements: Extraocular movements intact.      Conjunctiva/sclera: Conjunctivae normal.      Pupils: Pupils are equal, round, and reactive to light.   Neck:      Vascular: No carotid bruit.   Cardiovascular:      Rate and Rhythm: Normal rate and regular rhythm.      Pulses: Normal pulses.      Heart sounds: Normal heart sounds. No murmur heard.  Pulmonary:      Effort: Pulmonary effort is normal.      Breath sounds: Normal breath sounds.   Abdominal:      General: Bowel sounds are normal.      Palpations: Abdomen is soft.      Tenderness: There is no abdominal tenderness.      Hernia: No hernia is present.   Musculoskeletal:         General: No deformity. Normal range of motion.      Cervical back: Normal range of motion and neck supple.   Skin:     General: Skin is warm and dry.      Capillary Refill: Capillary refill takes less than 2 seconds.   Neurological:      Mental Status: He is alert and oriented to person, place, and time.      Sensory: No sensory deficit.      Coordination: Coordination normal.      Deep Tendon Reflexes: Reflexes normal.   Psychiatric:         Mood and Affect: Mood normal.         Behavior: Behavior normal.         "

## 2024-10-16 LAB
ALBUMIN SERPL-MCNC: 4.3 G/DL (ref 4.1–5.1)
ALP SERPL-CCNC: 83 IU/L (ref 44–121)
ALT SERPL-CCNC: 33 IU/L (ref 0–44)
AST SERPL-CCNC: 22 IU/L (ref 0–40)
BASOPHILS # BLD AUTO: 0.1 X10E3/UL (ref 0–0.2)
BASOPHILS NFR BLD AUTO: 1 %
BILIRUB SERPL-MCNC: 0.3 MG/DL (ref 0–1.2)
BUN SERPL-MCNC: 20 MG/DL (ref 6–24)
BUN/CREAT SERPL: 21 (ref 9–20)
CALCIUM SERPL-MCNC: 9.4 MG/DL (ref 8.7–10.2)
CHLORIDE SERPL-SCNC: 102 MMOL/L (ref 96–106)
CHOLEST SERPL-MCNC: 253 MG/DL (ref 100–199)
CO2 SERPL-SCNC: 23 MMOL/L (ref 20–29)
CREAT SERPL-MCNC: 0.96 MG/DL (ref 0.76–1.27)
EGFR: 102 ML/MIN/1.73
EOSINOPHIL # BLD AUTO: 0.2 X10E3/UL (ref 0–0.4)
EOSINOPHIL NFR BLD AUTO: 3 %
ERYTHROCYTE [DISTWIDTH] IN BLOOD BY AUTOMATED COUNT: 12.7 % (ref 11.6–15.4)
GLOBULIN SER-MCNC: 2.8 G/DL (ref 1.5–4.5)
GLUCOSE SERPL-MCNC: 95 MG/DL (ref 70–99)
HCT VFR BLD AUTO: 42.9 % (ref 37.5–51)
HDLC SERPL-MCNC: 61 MG/DL
HGB BLD-MCNC: 14.7 G/DL (ref 13–17.7)
IMM GRANULOCYTES # BLD: 0 X10E3/UL (ref 0–0.1)
IMM GRANULOCYTES NFR BLD: 0 %
LDLC SERPL CALC-MCNC: 170 MG/DL (ref 0–99)
LDLC/HDLC SERPL: 2.8 RATIO (ref 0–3.6)
LYMPHOCYTES # BLD AUTO: 3.1 X10E3/UL (ref 0.7–3.1)
LYMPHOCYTES NFR BLD AUTO: 52 %
MCH RBC QN AUTO: 30.9 PG (ref 26.6–33)
MCHC RBC AUTO-ENTMCNC: 34.3 G/DL (ref 31.5–35.7)
MCV RBC AUTO: 90 FL (ref 79–97)
MICRODELETION SYND BLD/T FISH: NORMAL
MONOCYTES # BLD AUTO: 0.5 X10E3/UL (ref 0.1–0.9)
MONOCYTES NFR BLD AUTO: 8 %
NEUTROPHILS # BLD AUTO: 2.2 X10E3/UL (ref 1.4–7)
NEUTROPHILS NFR BLD AUTO: 36 %
PLATELET # BLD AUTO: 261 X10E3/UL (ref 150–450)
POTASSIUM SERPL-SCNC: 4.6 MMOL/L (ref 3.5–5.2)
PROT SERPL-MCNC: 7.1 G/DL (ref 6–8.5)
RBC # BLD AUTO: 4.75 X10E6/UL (ref 4.14–5.8)
SL AMB VLDL CHOLESTEROL CALC: 22 MG/DL (ref 5–40)
SODIUM SERPL-SCNC: 139 MMOL/L (ref 134–144)
TRIGL SERPL-MCNC: 123 MG/DL (ref 0–149)
TSH SERPL DL<=0.005 MIU/L-ACNC: 1.93 UIU/ML (ref 0.45–4.5)
WBC # BLD AUTO: 5.9 X10E3/UL (ref 3.4–10.8)

## 2024-10-21 ENCOUNTER — TELEPHONE (OUTPATIENT)
Age: 44
End: 2024-10-21

## 2024-10-21 NOTE — TELEPHONE ENCOUNTER
Patient dropped off insurance form with Elvia Castro at his physical on 10/9. It needs to be completed and faxed to insurance by Oct 30 to satisfy requirements. Wife was just in the office and was told clerical could not locate. Please advise if another form is required or if fax has already been sent.

## 2024-10-22 ENCOUNTER — TELEPHONE (OUTPATIENT)
Dept: FAMILY MEDICINE CLINIC | Facility: CLINIC | Age: 44
End: 2024-10-22

## 2024-11-27 NOTE — PROGRESS NOTES
"Daily Note     Today's date: 2024  Patient name: Tiburcio Bell  : 1980  MRN: [id number]  Referring provider: Lombardi, Frank, DO  Dx:   Encounter Diagnosis     ICD-10-CM    1. Tendonitis of left rotator cuff  M75.82       2. Left shoulder pain, unspecified chronicity  M25.512       3. Tendonitis of shoulder, left  M77.8               Subjective:  Reports he was able to golf in [place] and it wasn't that bad. A bit sore on presentation.    Objective: See treatment diary below      Assessment: Tolerated treatment well. Able to re-introduce activities previously held due to pain with no pain production today. Good form noted throughout session with min tactile cues required. Patient demonstrated fatigue post treatment and would benefit from continued PT to continue with gains in strength and tolerance for day to day activities.       Plan: Continue per plan of care.  Single arm OH carry NV? Continue with SA exercises?        Insurance:  AMA/CMS Eval/ Re-eval Auth #/ Referral # Total units  Start date  Expiration date Extension  Visit limitation?  PT only or  PT+OT? Co-Insurance   [facility] 2024 Auth #[id number]  approved. 12 visits. 24 to 24                                                             POC Start Date POC Expiration Date Signed POC?   2024 8 weeks - 3/26/2024       Date           Units:  Used 1 2 3 4 5          Authed:  Remaining  11 10 9 8 7            Precautions:   Past Medical History:   Diagnosis Date   • Hyperlipidemia        Date 2024    Visit Number IE 2 3 4 5    Manual         GHJ mobs AC tape - KT ACJ tape \"x\" ACJ tape \"x\" ACJ tape\"x\"    ACJ X tape KT     First rib assessment  performed       Scapular mobs         STM  L UT 5' L UT 5' L UT/supraspinatus 5'     Tspine   P-A gr 5 manip x2 P-A gr 5 manip x2               TherEx         Chin tuck 3x10 3x10 X10  2x10 + ext X10  2x10 + ext     Thoracic extension Seated " 3x10 3x10 with foam roll 3x10 with foam roll 3x10 with foam roll     Rows and extension  GTB 3x10 each GTB 3x10 each    2x10 GTB+ER/IR GTB 1x10  *pain    RTB ext 2x10 GTB 3x10    TRX incline row  2x10 3x10 *held for pain 3x10    Foam roll serratus slides    2x10 2x10    Wall ball    Up/down L/R 10x through Up/down L/R 10x2 through        YTB  high row with ER and OH press 2x10 YTB  high row with ER and OH press 2x10    Serratus punches    4# wt 3x10 4# wt 2x15    Supine alphabet    2# wt 2# x2    Pec stretch at doorway  *pain       Prone I T Y  10x each intro 2x10 ea  2x10 ea             Neuro Re-Ed         Shoulder iso         Scap retraction 3x10 HEP                                                             TherAct         Patient education HEP and POC 10'        Posture education                                    Gait Training                                    Modalities                              [EENT/Resp Symptoms] : EENT/RESPIRATORY SYMPTOMS [Fever] : fever [Runny nose] : runny nose [Cough] : cough [___ Day(s)] : [unfilled] day(s) [Clear rhinorrhea] : clear rhinorrhea [Dry cough] : dry cough [Acetaminophen] : acetaminophen [Max Temp: ____] : Max temperature: [unfilled] [Sick Contacts: ___] : no sick contacts [Vomiting] : no vomiting [Diarrhea] : no diarrhea [[email or web address]] : 101